# Patient Record
Sex: MALE | Race: WHITE | Employment: FULL TIME | URBAN - METROPOLITAN AREA
[De-identification: names, ages, dates, MRNs, and addresses within clinical notes are randomized per-mention and may not be internally consistent; named-entity substitution may affect disease eponyms.]

---

## 2019-05-10 ENCOUNTER — TELEPHONE (OUTPATIENT)
Dept: GASTROENTEROLOGY | Facility: CLINIC | Age: 44
End: 2019-05-10

## 2019-05-10 ENCOUNTER — OFFICE VISIT (OUTPATIENT)
Dept: GASTROENTEROLOGY | Facility: CLINIC | Age: 44
End: 2019-05-10
Payer: COMMERCIAL

## 2019-05-10 VITALS
WEIGHT: 197.81 LBS | HEART RATE: 67 BPM | BODY MASS INDEX: 27.69 KG/M2 | HEIGHT: 70.75 IN | SYSTOLIC BLOOD PRESSURE: 113 MMHG | DIASTOLIC BLOOD PRESSURE: 64 MMHG

## 2019-05-10 DIAGNOSIS — K21.9 GASTROESOPHAGEAL REFLUX DISEASE, ESOPHAGITIS PRESENCE NOT SPECIFIED: Primary | ICD-10-CM

## 2019-05-10 PROCEDURE — 99203 OFFICE O/P NEW LOW 30 MIN: CPT | Performed by: INTERNAL MEDICINE

## 2019-05-10 PROCEDURE — 99212 OFFICE O/P EST SF 10 MIN: CPT | Performed by: INTERNAL MEDICINE

## 2019-05-10 RX ORDER — OMEPRAZOLE 20 MG/1
20 CAPSULE, DELAYED RELEASE ORAL EVERY MORNING
Qty: 90 CAPSULE | Refills: 3 | Status: SHIPPED | OUTPATIENT
Start: 2019-05-10 | End: 2019-06-05

## 2019-05-10 RX ORDER — AMOXICILLIN 500 MG/1
500 CAPSULE ORAL 3 TIMES DAILY
Refills: 0 | COMMUNITY
Start: 2019-05-07 | End: 2020-01-21

## 2019-05-10 NOTE — PATIENT INSTRUCTIONS
1. Schedule upper endoscopy (EGD) with monitored anesthesia care (MAC) with Dr. David Romero: GERD]    2. Gastroesophageal reflux disease (GERD) recommendations    -raise the head of the bed  -avoid tight fitting clothing or belts  -avoid eating la

## 2019-05-10 NOTE — TELEPHONE ENCOUNTER
Scheduled for:  EGD 07493  Provider Name: Dr. Alice Lui  Date:  6/5/19  [facility]  Sedation:  MAC  Time:  9:30 am, (pt is aware that [facility] will call the day before to confirm arrival time)  Prep: NPO after midnight  Meds/Allergies Reconciled?:  Physician Garett Graff

## 2019-05-10 NOTE — H&P
[facility], [facility] - Gastroenterology                                                                                                  Clinic History and Physical mcg by mouth before breakfast. Disp:  Rfl:      Allergies:  No Known Allergies    ROS:   all 10 systems were reviewed and were negative except as noted in the HPI    PHYSICAL EXAM:   Blood pressure 113/64, pulse 67, height 5' 10.75\" (1.797 m), weight 197 Prescriptions      No prescriptions requested or ordered in this encounter       Imaging & Referrals:  None       Audrey Vidal MD  [facility], [facility] - Gastroenterology  5/10/2019

## 2019-05-20 NOTE — TELEPHONE ENCOUNTER
CBLM to reschedule procedure. Please transfer to [place] at ext [phone] or [phone] for scheduling. Or please transfer to [place] in GI if unavailable. HOLD placed at [facility] on 6/19/19.

## 2019-06-03 NOTE — TELEPHONE ENCOUNTER
Spoke with pt's Mother. Per Destin Channel, pt does NOT need to be rescheduled. Pt's Mother was given procedure date & advised that [facility] will call with arrival time. She verbalized understanding.     Electronic case request was sent to [facility] via [facility], patients insurance ca

## 2019-06-03 NOTE — TELEPHONE ENCOUNTER
Pts mother Jonathon Schaefer called to verify time of procedure. Does this appt need to be rescheduled? Please call.

## 2019-06-05 ENCOUNTER — LAB REQUISITION (OUTPATIENT)
Dept: LAB | Facility: HOSPITAL | Age: 44
End: 2019-06-05
Payer: COMMERCIAL

## 2019-06-05 ENCOUNTER — TELEPHONE (OUTPATIENT)
Dept: GASTROENTEROLOGY | Facility: CLINIC | Age: 44
End: 2019-06-05

## 2019-06-05 DIAGNOSIS — Z01.89 ENCOUNTER FOR OTHER SPECIFIED SPECIAL EXAMINATIONS: ICD-10-CM

## 2019-06-05 PROCEDURE — 88312 SPECIAL STAINS GROUP 1: CPT | Performed by: INTERNAL MEDICINE

## 2019-06-05 PROCEDURE — 88305 TISSUE EXAM BY PATHOLOGIST: CPT | Performed by: INTERNAL MEDICINE

## 2019-06-05 RX ORDER — PANTOPRAZOLE SODIUM 40 MG/1
40 TABLET, DELAYED RELEASE ORAL
Qty: 120 TABLET | Refills: 1 | Status: SHIPPED | OUTPATIENT
Start: 2019-06-05 | End: 2019-09-05

## 2019-06-05 NOTE — TELEPHONE ENCOUNTER
Patient at [facility] today for EGD for GERD. Has LA Grade D esophagitis.      Discussed stopping omeprazole and starting pantoprazole BID for 60 days and repeat EGD in 8-10 weeks    GI Staff;    Please call him to schedule repeat EGD with MAC for diagnosis of

## 2019-06-06 ENCOUNTER — TELEPHONE (OUTPATIENT)
Dept: GASTROENTEROLOGY | Facility: CLINIC | Age: 44
End: 2019-06-06

## 2019-06-06 NOTE — TELEPHONE ENCOUNTER
Called patient to discuss biopsy results. No answer.  Left voice message    January Gordon MD  [facility], [facility] - Gastroenterology  6/6/2019  11:50 AM

## 2019-06-10 NOTE — TELEPHONE ENCOUNTER
Called patient and discussed results of biopsies which were unremarkable    Discussed plan/recommendations for PPI BID for 8 weeks then repeat EGD to eval for healing and underlying Justin's esophagus    All questions answered    Hanna Rodríguez MD  [facility]

## 2019-09-05 RX ORDER — PANTOPRAZOLE SODIUM 40 MG/1
40 TABLET, DELAYED RELEASE ORAL
Qty: 180 TABLET | Refills: 1 | Status: SHIPPED | OUTPATIENT
Start: 2019-09-05 | End: 2019-11-04

## 2019-09-05 NOTE — TELEPHONE ENCOUNTER
Requested Prescriptions     Pending Prescriptions Disp Refills   • PANTOPRAZOLE SODIUM 40 MG Oral Tab EC [Pharmacy Med Name: PANTOPRAZOLE SOD DR 40 MG TAB] 180 tablet 1     Sig: TAKE 1 TABLET (40 MG TOTAL) BY MOUTH 2 (TWO) TIMES DAILY BEFORE MEALS.      Kristie Vilchis

## 2019-12-18 ENCOUNTER — TELEPHONE (OUTPATIENT)
Dept: GASTROENTEROLOGY | Facility: CLINIC | Age: 44
End: 2019-12-18

## 2019-12-18 NOTE — TELEPHONE ENCOUNTER
Mother requesting for appt with MG before the end on the year due to severe acid reflux. Mother states pt is also having trouble breathing.   Please call [phone]

## 2019-12-18 NOTE — TELEPHONE ENCOUNTER
Patient contacted and message from Dr. Noé Resendiz given. Patient states he takes Pantoprazole once or twice daily. Patient will keep appointment with Reena Polanco on 01/21/20.     Patient advised to go to the ER if he develops shortness of breath or chest pa

## 2019-12-18 NOTE — TELEPHONE ENCOUNTER
Mother (not on HIPAA) states patient is overdue for EGD and is having problems with acid reflux on and off and shortness of breath. Denies shortness of breath ,chest pain or acid reflux at this time per mother.     Dr. Nataly Centenor do you want to schedule pat

## 2019-12-18 NOTE — TELEPHONE ENCOUNTER
The patient should be on pantoprazole BID. Has he been off? If he's not been taking it BID for 8 weeks, I'm concerned there will still be significant inflammation. We can schedule him for EGD to re-evaluate as was recommended.  I also think it is a good harvey

## 2019-12-30 NOTE — TELEPHONE ENCOUNTER
Dr. Kevin Summers - what time frame would you like this pt's EGD scheduled in? Is it urgent or can it wait? Please advise & I will call to schedule. Thank you!

## 2019-12-31 NOTE — TELEPHONE ENCOUNTER
The EGD can be scheduled non-urgently    Thank you    Johnson Matt MD  [facility], [facility] - Gastroenterology  12/31/2019  12:31 PM

## 2020-01-17 NOTE — PROGRESS NOTES
[facility] Follow-up Visit    Gracy apnea.     Pertinent Family Hx:  - No known history of esophageal or gastric cancer  - No family hx of CRC    Pertinent Social Hx:  - No tobacco use/No ETOH  - Lives with wife     History, Medications, Allergies, ROS:      Past Medical History:   Diagnosis patient is having movements of all 4 extremities   Psych: calm, cooperative    Nursing notes and vitals reviewed. Labs/Imaging:     Patient's labs and imaging were reviewed and discussed with patient today.      ASSESSMENT/PLAN:   Jairo Mike is a 38 year understanding], including but not limited to the risks of bleeding, infection, pain, as well as the risks of anesthesia and perforation all leading to prolonged hospitalization, surgical intervention, or even death.  I also specifically mentioned the miss r

## 2020-01-21 ENCOUNTER — TELEPHONE (OUTPATIENT)
Dept: GASTROENTEROLOGY | Facility: CLINIC | Age: 45
End: 2020-01-21

## 2020-01-21 ENCOUNTER — OFFICE VISIT (OUTPATIENT)
Dept: GASTROENTEROLOGY | Facility: CLINIC | Age: 45
End: 2020-01-21
Payer: COMMERCIAL

## 2020-01-21 VITALS
DIASTOLIC BLOOD PRESSURE: 76 MMHG | BODY MASS INDEX: 27.2 KG/M2 | HEIGHT: 70 IN | WEIGHT: 190 LBS | HEART RATE: 68 BPM | SYSTOLIC BLOOD PRESSURE: 115 MMHG

## 2020-01-21 DIAGNOSIS — R12 HEARTBURN: ICD-10-CM

## 2020-01-21 DIAGNOSIS — Z09 FOLLOW UP: Primary | ICD-10-CM

## 2020-01-21 DIAGNOSIS — K21.00 GASTROESOPHAGEAL REFLUX DISEASE WITH ESOPHAGITIS: ICD-10-CM

## 2020-01-21 DIAGNOSIS — K21.00 GASTROESOPHAGEAL REFLUX DISEASE WITH ESOPHAGITIS: Primary | ICD-10-CM

## 2020-01-21 PROCEDURE — 99214 OFFICE O/P EST MOD 30 MIN: CPT | Performed by: PHYSICIAN ASSISTANT

## 2020-01-21 NOTE — TELEPHONE ENCOUNTER
I left a voicemail message for pt informing him to please correct Dx code to read K21.0  No need to return my call unless, he has any questions.

## 2020-01-21 NOTE — TELEPHONE ENCOUNTER
Scheduled for: EGD 20271  Provider Name: Dr Dane Calles  Date: Luisa Albrecht 2/11/20  Location: [address] [place] [address]  Sedation: MAC  Time: 2:15 pm  Prep: Nothing after midnight the day before procedure and NPO 3 hrs prior procedure  Meds/Allergies Reconciled?: NKDA  Diagnosis with c

## 2020-01-21 NOTE — PATIENT INSTRUCTIONS
-EGD with MAC sedation with Dr. Oly Funez   -Medication Changes:    ** If MAC @ [facility]/NE:    - HOLD ACE/ARBs the night before and/or the day of the procedure(s)   - NO alcohol, recreational drugs nor erectile dysfunction mediations 24 hours before procedure(

## 2020-02-07 RX ORDER — PANTOPRAZOLE SODIUM 20 MG/1
20 TABLET, DELAYED RELEASE ORAL
Status: ON HOLD | COMMUNITY
End: 2020-02-11

## 2020-02-11 ENCOUNTER — HOSPITAL ENCOUNTER (OUTPATIENT)
Age: 45
Setting detail: HOSPITAL OUTPATIENT SURGERY
Discharge: HOME | End: 2020-02-11
Attending: INTERNAL MEDICINE | Admitting: INTERNAL MEDICINE
Payer: COMMERCIAL

## 2020-02-11 ENCOUNTER — ANESTHESIA (OUTPATIENT)
Dept: ENDOSCOPY | Age: 45
End: 2020-02-11
Payer: COMMERCIAL

## 2020-02-11 ENCOUNTER — TELEPHONE (OUTPATIENT)
Dept: GASTROENTEROLOGY | Facility: CLINIC | Age: 45
End: 2020-02-11

## 2020-02-11 ENCOUNTER — ANESTHESIA EVENT (OUTPATIENT)
Dept: ENDOSCOPY | Age: 45
End: 2020-02-11
Payer: COMMERCIAL

## 2020-02-11 VITALS
WEIGHT: 185 LBS | HEART RATE: 58 BPM | HEIGHT: 70 IN | RESPIRATION RATE: 13 BRPM | OXYGEN SATURATION: 100 % | BODY MASS INDEX: 26.48 KG/M2 | DIASTOLIC BLOOD PRESSURE: 78 MMHG | SYSTOLIC BLOOD PRESSURE: 112 MMHG

## 2020-02-11 DIAGNOSIS — R12 HEARTBURN: ICD-10-CM

## 2020-02-11 DIAGNOSIS — K21.00 GASTROESOPHAGEAL REFLUX DISEASE WITH ESOPHAGITIS: ICD-10-CM

## 2020-02-11 PROCEDURE — 43239 EGD BIOPSY SINGLE/MULTIPLE: CPT | Performed by: INTERNAL MEDICINE

## 2020-02-11 PROCEDURE — 99070 SPECIAL SUPPLIES PHYS/QHP: CPT | Performed by: INTERNAL MEDICINE

## 2020-02-11 RX ORDER — SODIUM CHLORIDE, SODIUM LACTATE, POTASSIUM CHLORIDE, CALCIUM CHLORIDE 600; 310; 30; 20 MG/100ML; MG/100ML; MG/100ML; MG/100ML
INJECTION, SOLUTION INTRAVENOUS CONTINUOUS
Status: DISCONTINUED | OUTPATIENT
Start: 2020-02-11 | End: 2020-02-11

## 2020-02-11 RX ORDER — LIDOCAINE HYDROCHLORIDE 10 MG/ML
INJECTION, SOLUTION EPIDURAL; INFILTRATION; INTRACAUDAL; PERINEURAL AS NEEDED
Status: DISCONTINUED | OUTPATIENT
Start: 2020-02-11 | End: 2020-02-11 | Stop reason: SURG

## 2020-02-11 RX ORDER — PANTOPRAZOLE SODIUM 40 MG/1
40 TABLET, DELAYED RELEASE ORAL DAILY
Qty: 90 TABLET | Refills: 3 | Status: SHIPPED | OUTPATIENT
Start: 2020-02-11 | End: 2020-09-21

## 2020-02-11 RX ADMIN — SODIUM CHLORIDE, SODIUM LACTATE, POTASSIUM CHLORIDE, CALCIUM CHLORIDE: 600; 310; 30; 20 INJECTION, SOLUTION INTRAVENOUS at 14:51:00

## 2020-02-11 RX ADMIN — LIDOCAINE HYDROCHLORIDE 50 MG: 10 INJECTION, SOLUTION EPIDURAL; INFILTRATION; INTRACAUDAL; PERINEURAL at 14:38:00

## 2020-02-11 RX ADMIN — SODIUM CHLORIDE, SODIUM LACTATE, POTASSIUM CHLORIDE, CALCIUM CHLORIDE: 600; 310; 30; 20 INJECTION, SOLUTION INTRAVENOUS at 14:38:00

## 2020-02-11 NOTE — OPERATIVE REPORT
Esophagogastroduodenoscopy (EGD) Report    Stephen MOREL 1975 Age 38year old   PCP None Pcp Endoscopist Estephanie Garcia MD     Date of procedure: 20    Procedure: EGD w/ biopsy     Pre-operative diagnosis: GERD with esophagitis    Post-op colored mucosa possibly representing Justin's esophagus. The segment was examined with high definition white light and narrow band imaging without any nodules or other mucosal abnormalities noted. Multiple cold forceps biopsies were obtained.  The esophage

## 2020-02-11 NOTE — ANESTHESIA PREPROCEDURE EVALUATION
Anesthesia PreOp Note    HPI:     Radames Bates is a 38year old male who presents for preoperative consultation requested by: Evelio Lomeli MD    Date of Surgery: 2/11/2020    Procedure(s):  ESOPHAGOGASTRODUODENOSCOPY (EGD)  Indication: Gastroesophageal status: Never Smoker      Smokeless tobacco: Never Used    Substance and Sexual Activity      Alcohol use: Never        Frequency: Never      Drug use: Never      Sexual activity: Not on file    Lifestyle      Physical activity:        Days per week: Not o risks including possible dental damage if relevant, major complications, and any alternative forms of anesthetic management. All of the patient's questions were answered to the best of my ability. The patient desires the anesthetic management as planned.

## 2020-02-11 NOTE — ANESTHESIA POSTPROCEDURE EVALUATION
Patient: Kahty Ayon    Procedure Summary     Date:  02/11/20 Room / Location:  [facility] ENDOSCOPY 01 / [facility] ENDO    Anesthesia Start:  1857 Anesthesia Stop:  3334    Procedure:  ESOPHAGOGASTRODUODENOSCOPY (EGD) (N/A ) Diagnosis:       Gastroesophageal reflux

## 2020-02-11 NOTE — H&P
History & Physical Examination    Patient Name: Sancho Zee  MRN: [id number]  CSN: [id number]  YOB: 1975    Diagnosis: GERD with esophagitis    Pantoprazole Sodium 20 MG Oral Tab EC, Take 20 mg by mouth every morning before breakfast., Disp: ,

## 2020-02-11 NOTE — TELEPHONE ENCOUNTER
Patient post-endoscopy today at [facility] says he needs refill on pantoprazole    Sending script    Rigoberto Dixon MD  [facility], [facility] - Gastroenterology  2/11/2020  3:02 PM

## 2020-02-12 ENCOUNTER — TELEPHONE (OUTPATIENT)
Dept: GASTROENTEROLOGY | Facility: CLINIC | Age: 45
End: 2020-02-12

## 2020-02-13 NOTE — TELEPHONE ENCOUNTER
Entered into Epic:Recall EGD in 3 years per  Dr. Ingram Slider for Justin's esophagus. Last EGD done 2/11/2020, next due 2/11/2023. HM updated.

## 2020-02-13 NOTE — TELEPHONE ENCOUNTER
Called patient and spoke with him    Discussed the findings on the EGD consistent with Justin's esophagus. Discussed it is a pre-cancerous change and cancer can develop in the esophagus though the risk is low.  Discussed recommendations for PPI once daily

## 2020-09-21 ENCOUNTER — TELEPHONE (OUTPATIENT)
Dept: GASTROENTEROLOGY | Facility: CLINIC | Age: 45
End: 2020-09-21

## 2020-09-21 RX ORDER — PANTOPRAZOLE SODIUM 40 MG/1
40 TABLET, DELAYED RELEASE ORAL
Qty: 120 TABLET | Refills: 0 | Status: SHIPPED | OUTPATIENT
Start: 2020-09-21 | End: 2020-10-20

## 2020-09-21 NOTE — TELEPHONE ENCOUNTER
Dr Kacie Torre, patient does need rx sent to [address] [place] [address] please. Accepted appt with you Thurs Nov 5, arrival 2:15pm and given directions to [facility] office. Thanks, may close encounter once rx sent.

## 2020-09-21 NOTE — TELEPHONE ENCOUNTER
Patient called in stating that he needs a endoscopy procedure. I advised he would need 30 min appt first.Patient is wondering if he can bypass the appt and make an appt for the procedure right away.  Please advise thank you

## 2020-09-21 NOTE — TELEPHONE ENCOUNTER
He should take his pantoprazole twice a day at this time. Does he need a new prescription?     He should also make a non-urgent follow up to see how he is doing after a period of time on this    Thanks    Sanjay Edmondson MD  [address] [place] [address]

## 2020-09-21 NOTE — TELEPHONE ENCOUNTER
Dr Deana Austin, patient contacted re below egd request. He had egd 2/11/2020, Justin's, with 3 year recall. States reflux has worsened at night--he wakes up with acid in his throat, coughing.  Reviewed reflux precautions in detail and is following pretty wel

## 2020-09-21 NOTE — TELEPHONE ENCOUNTER
Pantoprazole BID sent    January Gordon MD  [facility], [facility] - Gastroenterology  9/21/2020  6:03 PM

## 2020-10-20 RX ORDER — PANTOPRAZOLE SODIUM 40 MG/1
40 TABLET, DELAYED RELEASE ORAL
Qty: 60 TABLET | Refills: 1 | Status: SHIPPED | OUTPATIENT
Start: 2020-10-20 | End: 2020-12-15

## 2020-10-20 NOTE — TELEPHONE ENCOUNTER
Requested Prescriptions     Pending Prescriptions Disp Refills   • PANTOPRAZOLE SODIUM 40 MG Oral Tab EC [Pharmacy Med Name: PANTOPRAZOLE SOD DR 40 MG TAB] 60 tablet 1     Sig: TAKE 1 TABLET (40 MG TOTAL) BY MOUTH 2 (TWO) TIMES DAILY BEFORE MEALS.      LOV

## 2020-11-05 ENCOUNTER — OFFICE VISIT (OUTPATIENT)
Dept: GASTROENTEROLOGY | Facility: CLINIC | Age: 45
End: 2020-11-05
Payer: COMMERCIAL

## 2020-11-05 ENCOUNTER — TELEPHONE (OUTPATIENT)
Dept: GASTROENTEROLOGY | Facility: CLINIC | Age: 45
End: 2020-11-05

## 2020-11-05 VITALS
TEMPERATURE: 96 F | WEIGHT: 207 LBS | BODY MASS INDEX: 29.63 KG/M2 | DIASTOLIC BLOOD PRESSURE: 84 MMHG | SYSTOLIC BLOOD PRESSURE: 124 MMHG | HEIGHT: 70 IN

## 2020-11-05 DIAGNOSIS — K22.70 BARRETT'S ESOPHAGUS WITHOUT DYSPLASIA: ICD-10-CM

## 2020-11-05 DIAGNOSIS — K21.00 GASTROESOPHAGEAL REFLUX DISEASE WITH ESOPHAGITIS WITHOUT HEMORRHAGE: Primary | ICD-10-CM

## 2020-11-05 PROCEDURE — 3079F DIAST BP 80-89 MM HG: CPT | Performed by: INTERNAL MEDICINE

## 2020-11-05 PROCEDURE — 3074F SYST BP LT 130 MM HG: CPT | Performed by: INTERNAL MEDICINE

## 2020-11-05 PROCEDURE — 3008F BODY MASS INDEX DOCD: CPT | Performed by: INTERNAL MEDICINE

## 2020-11-05 PROCEDURE — 99214 OFFICE O/P EST MOD 30 MIN: CPT | Performed by: INTERNAL MEDICINE

## 2020-11-05 RX ORDER — FAMOTIDINE 40 MG/1
40 TABLET, FILM COATED ORAL NIGHTLY PRN
Qty: 90 TABLET | Refills: 3 | Status: SHIPPED | OUTPATIENT
Start: 2020-11-05 | End: 2021-11-02

## 2020-11-05 NOTE — PROGRESS NOTES
[facility], [facility] - Gastroenterology                                                                                                  Clinic Progress Note    Patient pr ENDOSCOPY,EXAM        Family Hx:   Family History   Problem Relation Age of Onset   • Other (Other) Father         Unknown caused   • No Known Problems Mother       Social History: Social History    Tobacco Use      Smoking status: Never Smoker      Smokel to the lining of the intestine. This process is called intestinal metaplasia. We discussed no signs or symptoms are associated with Justin's esophagus, but it is commonly found in people with gastroesophageal reflux disease (GERD).  A small number of peopl problems like HTN, Dm, older age, etc, which he does not have.  There has also been a recent randomized study of PPIs with a large number of patients 3 years in length without any of the concerning/proposed adverse events aside from possible increase in ent

## 2020-11-05 NOTE — TELEPHONE ENCOUNTER
GI staff:    Please change recall for EGD to June 2021    Then close encounter    Thanks    Herberth Jimenez MD  [facility], [facility] - Gastroenterology  11/5/2020  2:51 PM

## 2020-11-05 NOTE — PATIENT INSTRUCTIONS
1. Continue your pantoprazole 40 mg twice a day. Best if you take the morning dose 30 minutes before breakfast in the morning and second dose 30 minutes before dinner in the evening. It is ok to take with food or after food if you forget.     2. Take famoti

## 2020-12-15 RX ORDER — PANTOPRAZOLE SODIUM 40 MG/1
40 TABLET, DELAYED RELEASE ORAL
Qty: 60 TABLET | Refills: 1 | Status: SHIPPED | OUTPATIENT
Start: 2020-12-15 | End: 2021-04-09

## 2021-04-09 RX ORDER — PANTOPRAZOLE SODIUM 40 MG/1
TABLET, DELAYED RELEASE ORAL
Qty: 90 TABLET | Refills: 3 | Status: SHIPPED | OUTPATIENT
Start: 2021-04-09 | End: 2022-01-26

## 2021-04-09 NOTE — TELEPHONE ENCOUNTER
Requested Prescriptions     Pending Prescriptions Disp Refills   • PANTOPRAZOLE SODIUM 40 MG Oral Tab EC [Pharmacy Med Name: PANTOPRAZOLE SOD DR 40 MG TAB] 90 tablet 3     Sig: TAKE 1 TABLET BY MOUTH EVERY DAY     LOV: 11/05/2020  Last Refill: 12/15/2020

## 2021-04-15 ENCOUNTER — TELEPHONE (OUTPATIENT)
Dept: GASTROENTEROLOGY | Facility: CLINIC | Age: 46
End: 2021-04-15

## 2021-04-15 NOTE — TELEPHONE ENCOUNTER
Patient outreach message received. Patient due for repeat EGD in June 2021. \"Please change recall for EGD to June 2021\"    Recall reminder letter mailed out to patient.

## 2021-11-01 ENCOUNTER — TELEPHONE (OUTPATIENT)
Dept: GASTROENTEROLOGY | Facility: CLINIC | Age: 46
End: 2021-11-01

## 2021-11-01 DIAGNOSIS — K22.70 BARRETT'S ESOPHAGUS WITHOUT DYSPLASIA: ICD-10-CM

## 2021-11-01 DIAGNOSIS — K21.9 GASTROESOPHAGEAL REFLUX DISEASE, UNSPECIFIED WHETHER ESOPHAGITIS PRESENT: Primary | ICD-10-CM

## 2021-11-01 NOTE — TELEPHONE ENCOUNTER
Pt is taking pantoprazole BID & famotidine at nighttime    This regimen is not working for him    He does avoid spicy greasy foods. He does not drink alcohol or coffee. He drinks water or mild.  He does have an occasional can of soda    Pt wakes up around 1

## 2021-11-02 RX ORDER — FAMOTIDINE 40 MG/1
40 TABLET, FILM COATED ORAL NIGHTLY PRN
Qty: 90 TABLET | Refills: 3 | Status: SHIPPED | OUTPATIENT
Start: 2021-11-02

## 2021-11-02 NOTE — TELEPHONE ENCOUNTER
We had discussed repeating his EGD, so yes, should schedule this. He should be taking the pantoprazole twice a day best if take 30 minutes before breakfast and 30 minutes before dinner. I would also make sure he is taking the famotidine at bedtime.  Can

## 2021-11-02 NOTE — TELEPHONE ENCOUNTER
Requested Prescriptions     Pending Prescriptions Disp Refills   • FAMOTIDINE 40 MG Oral Tab [Pharmacy Med Name: FAMOTIDINE 40 MG TABLET] 90 tablet 3     Sig: TAKE 1 TABLET (40 MG TOTAL) BY MOUTH NIGHTLY AS NEEDED FOR HEARTBURN.        LOV 11/05/2020  LR  1

## 2021-11-03 NOTE — TELEPHONE ENCOUNTER
Dr. Kacie Torre,    Please clarify how much patient should be taking of Famotidine as he currently takes 1 tab  (40 mg total) as needed. Patient does not feel this makes a difference when he does use it.

## 2021-11-04 RX ORDER — SUCRALFATE 1 G/1
1 TABLET ORAL 4 TIMES DAILY PRN
Qty: 120 TABLET | Refills: 3 | Status: SHIPPED | OUTPATIENT
Start: 2021-11-04

## 2021-11-04 NOTE — TELEPHONE ENCOUNTER
Ok, then can stop famotidine if not helping. Can try carafate QID PRN. Will send to pharmacy.     Adelfo Singh MD  [facility], [facility] - Gastroenterology  11/4/2021  12:27 PM

## 2021-11-04 NOTE — TELEPHONE ENCOUNTER
Contacted patient and reviewed message below for MG. Patient will try medication and see how it works.      GI Schedulers--    Please contact patient to schedule procedure per Dr. Ian Wilson orders:     Please schedule EGD with MAC for GERD and Justin's e

## 2021-11-05 ENCOUNTER — HOSPITAL ENCOUNTER (EMERGENCY)
Facility: HOSPITAL | Age: 46
Discharge: HOME | End: 2021-11-05
Attending: EMERGENCY MEDICINE
Payer: COMMERCIAL

## 2021-11-05 VITALS
WEIGHT: 200 LBS | BODY MASS INDEX: 29 KG/M2 | OXYGEN SATURATION: 99 % | HEART RATE: 81 BPM | SYSTOLIC BLOOD PRESSURE: 120 MMHG | DIASTOLIC BLOOD PRESSURE: 81 MMHG | TEMPERATURE: 97 F | RESPIRATION RATE: 18 BRPM

## 2021-11-05 DIAGNOSIS — S61.213A LACERATION OF LEFT MIDDLE FINGER WITHOUT FOREIGN BODY WITHOUT DAMAGE TO NAIL, INITIAL ENCOUNTER: Primary | ICD-10-CM

## 2021-11-05 PROCEDURE — 99282 EMERGENCY DEPT VISIT SF MDM: CPT
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
## 2021-11-24 NOTE — TELEPHONE ENCOUNTER
Scheduled for:  EGD 88794  Provider Name:  Dr. Leslye Almeida  Date:  1/25/2022  Location:  [address] [place] [address]  Sedation:  MAC  Time:  2:00 pm, arrival 1:00 pm  Prep:  NPO after midnight  Meds/Allergies Reconciled?:  Yes  Diagnosis with codes:  GERD K21.9; Justin's esophagu

## 2022-01-22 ENCOUNTER — LAB ENCOUNTER (OUTPATIENT)
Dept: LAB | Facility: HOSPITAL | Age: 47
End: 2022-01-22
Attending: INTERNAL MEDICINE
Payer: COMMERCIAL

## 2022-01-22 DIAGNOSIS — Z01.818 PRE-OP TESTING: ICD-10-CM

## 2022-01-23 LAB — SARS-COV-2 RNA RESP QL NAA+PROBE: NOT DETECTED

## 2022-01-24 ENCOUNTER — TELEPHONE (OUTPATIENT)
Dept: GASTROENTEROLOGY | Facility: CLINIC | Age: 47
End: 2022-01-24

## 2022-01-25 ENCOUNTER — TELEPHONE (OUTPATIENT)
Dept: GASTROENTEROLOGY | Facility: CLINIC | Age: 47
End: 2022-01-25

## 2022-01-25 ENCOUNTER — ANESTHESIA EVENT (OUTPATIENT)
Dept: ENDOSCOPY | Age: 47
End: 2022-01-25
Payer: COMMERCIAL

## 2022-01-25 ENCOUNTER — HOSPITAL ENCOUNTER (OUTPATIENT)
Age: 47
Setting detail: HOSPITAL OUTPATIENT SURGERY
Discharge: HOME | End: 2022-01-25
Attending: INTERNAL MEDICINE | Admitting: INTERNAL MEDICINE
Payer: COMMERCIAL

## 2022-01-25 ENCOUNTER — ANESTHESIA (OUTPATIENT)
Dept: ENDOSCOPY | Age: 47
End: 2022-01-25
Payer: COMMERCIAL

## 2022-01-25 VITALS
SYSTOLIC BLOOD PRESSURE: 115 MMHG | RESPIRATION RATE: 11 BRPM | HEIGHT: 70 IN | OXYGEN SATURATION: 100 % | HEART RATE: 58 BPM | BODY MASS INDEX: 28.63 KG/M2 | DIASTOLIC BLOOD PRESSURE: 71 MMHG | WEIGHT: 200 LBS

## 2022-01-25 DIAGNOSIS — Z01.818 PRE-OP TESTING: Primary | ICD-10-CM

## 2022-01-25 DIAGNOSIS — K22.70 BARRETT'S ESOPHAGUS WITHOUT DYSPLASIA: ICD-10-CM

## 2022-01-25 DIAGNOSIS — K21.00 GASTROESOPHAGEAL REFLUX DISEASE WITH ESOPHAGITIS, UNSPECIFIED WHETHER HEMORRHAGE: Primary | ICD-10-CM

## 2022-01-25 DIAGNOSIS — Z12.12 SCREENING FOR COLORECTAL CANCER: ICD-10-CM

## 2022-01-25 DIAGNOSIS — Z12.11 SCREENING FOR COLORECTAL CANCER: ICD-10-CM

## 2022-01-25 DIAGNOSIS — K21.9 GASTROESOPHAGEAL REFLUX DISEASE, UNSPECIFIED WHETHER ESOPHAGITIS PRESENT: ICD-10-CM

## 2022-01-25 PROCEDURE — 88312 SPECIAL STAINS GROUP 1: CPT | Performed by: INTERNAL MEDICINE

## 2022-01-25 PROCEDURE — 43239 EGD BIOPSY SINGLE/MULTIPLE: CPT | Performed by: INTERNAL MEDICINE

## 2022-01-25 PROCEDURE — 99070 SPECIAL SUPPLIES PHYS/QHP: CPT | Performed by: INTERNAL MEDICINE

## 2022-01-25 PROCEDURE — 88305 TISSUE EXAM BY PATHOLOGIST: CPT | Performed by: INTERNAL MEDICINE

## 2022-01-25 RX ORDER — SODIUM CHLORIDE, SODIUM LACTATE, POTASSIUM CHLORIDE, CALCIUM CHLORIDE 600; 310; 30; 20 MG/100ML; MG/100ML; MG/100ML; MG/100ML
INJECTION, SOLUTION INTRAVENOUS CONTINUOUS
Status: DISCONTINUED | OUTPATIENT
Start: 2022-01-25 | End: 2022-01-25

## 2022-01-25 RX ORDER — LIDOCAINE HYDROCHLORIDE 10 MG/ML
INJECTION, SOLUTION EPIDURAL; INFILTRATION; INTRACAUDAL; PERINEURAL AS NEEDED
Status: DISCONTINUED | OUTPATIENT
Start: 2022-01-25 | End: 2022-01-25 | Stop reason: SURG

## 2022-01-25 RX ADMIN — LIDOCAINE HYDROCHLORIDE 25 MG: 10 INJECTION, SOLUTION EPIDURAL; INFILTRATION; INTRACAUDAL; PERINEURAL at 13:42:00

## 2022-01-25 RX ADMIN — SODIUM CHLORIDE, SODIUM LACTATE, POTASSIUM CHLORIDE, CALCIUM CHLORIDE: 600; 310; 30; 20 INJECTION, SOLUTION INTRAVENOUS at 13:42:00

## 2022-01-25 NOTE — OPERATIVE REPORT
Esophagogastroduodenoscopy (EGD) Report    Maxim MOREL 1975 Age 51year old   PCP Joce Vee MD     Date of procedure: 22    Procedure: EGD w/ biopsy     Pre-operative diagnosis: GERD, Justin's esophagus the squamocolumnar junction of 50-75% consistent with LA grade C/D esophagitis.  Evaluation for previously visualized and diagnosed Justin's was not possible in this setting and thus no biopsies taken due to need to improve esophagitis for evaluation of th

## 2022-01-25 NOTE — ANESTHESIA POSTPROCEDURE EVALUATION
Patient: Wang Morton    Procedure Summary     Date: 01/25/22 Room / Location: [facility] ENDOSCOPY 01 / [facility] ENDO    Anesthesia Start: 3310 Anesthesia Stop: 8019    Procedure: ESOPHAGOGASTRODUODENOSCOPY (EGD) (N/A ) Diagnosis:       Gastroesophageal reflux disea
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
## 2022-01-25 NOTE — TELEPHONE ENCOUNTER
GI Schedulers--    Please see message below to assist in schedule patient.  Should be scheduled for the next 2-3 months [facility]

## 2022-01-25 NOTE — TELEPHONE ENCOUNTER
1st attempt calling patient ,lmtcb to assist him of scheduling his procedure for Egd and Colonoscopy per Alon Hardwick. Awaiting for reply call  back from patient .   Gi schedulers:    GI staff:     Please find out what his mail order pharmacy is with his wif

## 2022-01-25 NOTE — TELEPHONE ENCOUNTER
Discussed with patient after procedure findings. Says he takes pantoprazole once a day and sometimes twice a day. Discussed he needs to take his PPI twice a day. Discussed also would like to change to omeprazole 40 mg PO BID at this time.  Discussed we didn

## 2022-01-25 NOTE — H&P
History & Physical Examination    Patient Name: Isaac Degroot  MRN: [id number]  [facility]: [id number]  YOB: 1975    Diagnosis: GERD, Justin's esophagus    sucralfate 1 g Oral Tab, Take 1 tablet (1 g total) by mouth 4 (four) times daily as needed. , Dis

## 2022-01-25 NOTE — TELEPHONE ENCOUNTER
I spoke to the pt and he knows to arrive at the [address] [facility] location at 1 pm    He was also told he can view his instructions on [employer]    We went over the instructions on the phone    All of his questions were answered

## 2022-01-25 NOTE — ANESTHESIA PREPROCEDURE EVALUATION
Anesthesia PreOp Note    HPI:     Augusta Evans is a 51year old male who presents for preoperative consultation requested by: Rajesh Munoz MD    Date of Surgery: 1/25/2022    Procedure(s):  ESOPHAGOGASTRODUODENOSCOPY (EGD)  Indication: Gastroesophageal used    Substance and Sexual Activity      Alcohol use: Never      Drug use: Never      Sexual activity: Not on file    Other Topics      Concerns:        Not on file    Social History Narrative      Not on file    Social Determinants of Health  Financial MD  1/25/2022 1:10 PM

## 2022-01-25 NOTE — TELEPHONE ENCOUNTER
Left message for Dakotah Astorga to call back. Please obtain mail order pharmacy name/address to send omeprazole per patient's request.     Sent schedulers separate TE with orders to schedule patient procedure.

## 2022-01-26 RX ORDER — OMEPRAZOLE 40 MG/1
40 CAPSULE, DELAYED RELEASE ORAL 2 TIMES DAILY
Qty: 180 CAPSULE | Refills: 3 | Status: SHIPPED | OUTPATIENT
Start: 2022-01-26

## 2022-01-26 NOTE — TELEPHONE ENCOUNTER
Thank you. I've sent the new prescription for omeprazole 40 mg to his mail order pharmacy. Can hold off on sucralfate at this time.  Please let him know I sent the new prescription, he should stop the pantoprazole once he starts omeprazole and can hold off

## 2022-01-26 NOTE — TELEPHONE ENCOUNTER
Called patient back today and was able to speak with him ,verified  and accepted the date and time ,location ,patient verbalizes understanding of above discussion and plan.     Scheduled for:  Colonoscopy [address] ,[address]   Provider Name:  Donell Chairez

## 2022-01-26 NOTE — TELEPHONE ENCOUNTER
Contacted patient and reviewed message below. All questions answered. Patient verbalized understanding.

## 2022-01-26 NOTE — TELEPHONE ENCOUNTER
KAYE:;   Spoke to patient and was scheduled for Colon and Egd on 4/12/2022 at [facility] @2:15 Pm   Patient gave me the Pharmacy and its [address]   Phone# [phone]  And he stated that he never received the medication that you sent Sucralfate

## 2022-03-19 ENCOUNTER — APPOINTMENT (OUTPATIENT)
Dept: GENERAL RADIOLOGY | Facility: HOSPITAL | Age: 47
End: 2022-03-19
Attending: EMERGENCY MEDICINE
Payer: COMMERCIAL

## 2022-03-19 ENCOUNTER — HOSPITAL ENCOUNTER (EMERGENCY)
Facility: HOSPITAL | Age: 47
Discharge: HOME | End: 2022-03-19
Attending: EMERGENCY MEDICINE
Payer: COMMERCIAL

## 2022-03-19 VITALS
SYSTOLIC BLOOD PRESSURE: 135 MMHG | BODY MASS INDEX: 28.63 KG/M2 | OXYGEN SATURATION: 98 % | HEART RATE: 72 BPM | HEIGHT: 70 IN | RESPIRATION RATE: 18 BRPM | DIASTOLIC BLOOD PRESSURE: 85 MMHG | TEMPERATURE: 98 F | WEIGHT: 200 LBS

## 2022-03-19 DIAGNOSIS — J06.9 VIRAL UPPER RESPIRATORY TRACT INFECTION WITH COUGH: Primary | ICD-10-CM

## 2022-03-19 DIAGNOSIS — D50.8 OTHER IRON DEFICIENCY ANEMIA: ICD-10-CM

## 2022-03-19 LAB
ANION GAP SERPL CALC-SCNC: 7 MMOL/L (ref 0–18)
BASOPHILS # BLD AUTO: 0.05 X10(3) UL (ref 0–0.2)
BASOPHILS NFR BLD AUTO: 0.8 %
BUN BLD-MCNC: 17 MG/DL (ref 7–18)
BUN/CREAT SERPL: 14.8 (ref 10–20)
CALCIUM BLD-MCNC: 8.9 MG/DL (ref 8.5–10.1)
CHLORIDE SERPL-SCNC: 108 MMOL/L (ref 98–112)
CO2 SERPL-SCNC: 27 MMOL/L (ref 21–32)
CREAT BLD-MCNC: 1.15 MG/DL
DEPRECATED RDW RBC AUTO: 37.8 FL (ref 35.1–46.3)
EOSINOPHIL # BLD AUTO: 0.09 X10(3) UL (ref 0–0.7)
EOSINOPHIL NFR BLD AUTO: 1.4 %
ERYTHROCYTE [DISTWIDTH] IN BLOOD BY AUTOMATED COUNT: 15 % (ref 11–15)
GLUCOSE BLD-MCNC: 157 MG/DL (ref 70–99)
HCT VFR BLD AUTO: 37.4 %
HGB BLD-MCNC: 10.6 G/DL
IMM GRANULOCYTES NFR BLD: 0.5 %
LYMPHOCYTES # BLD AUTO: 1.18 X10(3) UL (ref 1–4)
LYMPHOCYTES NFR BLD AUTO: 18.2 %
MCH RBC QN AUTO: 20.1 PG (ref 26–34)
MCHC RBC AUTO-ENTMCNC: 28.3 G/DL (ref 31–37)
MCV RBC AUTO: 70.8 FL
MONOCYTES # BLD AUTO: 0.47 X10(3) UL (ref 0.1–1)
MONOCYTES NFR BLD AUTO: 7.3 %
NEUTROPHILS # BLD AUTO: 4.65 X10 (3) UL (ref 1.5–7.7)
NEUTROPHILS # BLD AUTO: 4.65 X10(3) UL (ref 1.5–7.7)
OSMOLALITY SERPL CALC.SUM OF ELEC: 299 MOSM/KG (ref 275–295)
PLATELET # BLD AUTO: 295 10(3)UL (ref 150–450)
POTASSIUM SERPL-SCNC: 3.7 MMOL/L (ref 3.5–5.1)
RBC # BLD AUTO: 5.28 X10(6)UL
SODIUM SERPL-SCNC: 142 MMOL/L (ref 136–145)
TROPONIN I HIGH SENSITIVITY: 11 NG/L
WBC # BLD AUTO: 6.5 X10(3) UL (ref 4–11)

## 2022-03-19 PROCEDURE — 99284 EMERGENCY DEPT VISIT MOD MDM: CPT

## 2022-03-19 PROCEDURE — 96360 HYDRATION IV INFUSION INIT: CPT

## 2022-03-19 PROCEDURE — 71045 X-RAY EXAM CHEST 1 VIEW: CPT | Performed by: EMERGENCY MEDICINE

## 2022-03-19 PROCEDURE — 84484 ASSAY OF TROPONIN QUANT: CPT | Performed by: EMERGENCY MEDICINE

## 2022-03-19 PROCEDURE — 85025 COMPLETE CBC W/AUTO DIFF WBC: CPT | Performed by: EMERGENCY MEDICINE

## 2022-03-19 PROCEDURE — 93005 ELECTROCARDIOGRAM TRACING: CPT

## 2022-03-19 PROCEDURE — 93010 ELECTROCARDIOGRAM REPORT: CPT | Performed by: EMERGENCY MEDICINE

## 2022-03-19 PROCEDURE — 80048 BASIC METABOLIC PNL TOTAL CA: CPT | Performed by: EMERGENCY MEDICINE

## 2022-03-19 RX ORDER — FLUTICASONE PROPIONATE 50 MCG
1 SPRAY, SUSPENSION (ML) NASAL 2 TIMES DAILY
Qty: 16 G | Refills: 0 | Status: SHIPPED | OUTPATIENT
Start: 2022-03-19

## 2022-03-19 NOTE — ED INITIAL ASSESSMENT (HPI)
Pt reports a productive cough with green sputum x 2 weeks. Pt states that he had a syncopal episode at 2am. Pt denies any pain at this time.  Pt seen at another clinic and given a neb, had a negative flu swab, but was told he had an abnormal ekg

## 2022-04-05 ENCOUNTER — TELEPHONE (OUTPATIENT)
Dept: GASTROENTEROLOGY | Facility: CLINIC | Age: 47
End: 2022-04-05

## 2022-04-09 ENCOUNTER — LAB ENCOUNTER (OUTPATIENT)
Dept: LAB | Facility: HOSPITAL | Age: 47
End: 2022-04-09
Attending: INTERNAL MEDICINE
Payer: COMMERCIAL

## 2022-04-09 DIAGNOSIS — Z01.818 PRE-OP TESTING: ICD-10-CM

## 2022-04-10 LAB — SARS-COV-2 RNA RESP QL NAA+PROBE: NOT DETECTED

## 2022-04-11 ENCOUNTER — TELEPHONE (OUTPATIENT)
Dept: GASTROENTEROLOGY | Facility: CLINIC | Age: 47
End: 2022-04-11

## 2022-04-11 RX ORDER — OMEPRAZOLE 40 MG/1
40 CAPSULE, DELAYED RELEASE ORAL 2 TIMES DAILY
Qty: 180 CAPSULE | Refills: 1 | Status: SHIPPED | OUTPATIENT
Start: 2022-04-11

## 2022-04-11 NOTE — TELEPHONE ENCOUNTER
Paged by patient. Says he didn't get bowel prep. Discussed can send but we reviewed diet today and he notes solid foods for breakfast and lunch. Discussed my concerns with this of not being cleaned out completely even with prep and he may need to repeat so given the indication may be best to reschedule which he is fine with. He now understands need for clear liquid diet entire day prior. Also requesting refill for omeprazole.     GI staff:    Please contact endo and cancel procedures for tomorrow 4/12/22 and reschedule to a different day    Thanks    Cheral Harada, MD  [facility], [facility] - Gastroenterology  4/11/2022  6:14 PM

## 2022-04-12 NOTE — TELEPHONE ENCOUNTER
GI Schedulers--     Please contact patient to reschedule procedure. See TE 1/25/22 for orders. Patient cancelled in Epic, surgical case change request made.

## 2022-04-13 NOTE — TELEPHONE ENCOUNTER
Can you clarify which pharmacy to send to. The one currently selected is his mail order which I would imagine would not work?     Thanks    Jim Rothman MD  [facility], [facility] - Gastroenterology  4/13/2022  6:24 PM

## 2022-04-13 NOTE — TELEPHONE ENCOUNTER
Dr. Jim Huang--    This patient is scheduled    Please send his Golytely prep to the pharmacy on file.  Thank you    You may close this TE when done :)

## 2022-04-14 ENCOUNTER — TELEPHONE (OUTPATIENT)
Dept: GASTROENTEROLOGY | Facility: CLINIC | Age: 47
End: 2022-04-14

## 2022-04-14 NOTE — TELEPHONE ENCOUNTER
Pep sent    Thanks    Matthew Bhardwaj MD  [facility], [facility] - Gastroenterology  4/14/2022  2:01 PM

## 2022-04-14 NOTE — TELEPHONE ENCOUNTER
Dr Darrell Callaway,   I contcated patient and verified his pharmacy of choice is [facility] on [address] and [employer], that is file.  Thank you    May close encounter when done

## 2022-04-15 ENCOUNTER — TELEPHONE (OUTPATIENT)
Dept: GASTROENTEROLOGY | Facility: CLINIC | Age: 47
End: 2022-04-15

## 2022-04-15 NOTE — TELEPHONE ENCOUNTER
Contacted [facility] and clarified instructions per Dr. Andrea Wisdom previous notes should be Omeprazole 40mg - Take 1 capsule by mouth twice a day.

## 2022-04-20 ENCOUNTER — TELEPHONE (OUTPATIENT)
Dept: GASTROENTEROLOGY | Facility: CLINIC | Age: 47
End: 2022-04-20

## 2022-04-20 NOTE — TELEPHONE ENCOUNTER
Contacted patient and discussed that per Dr. Andrea Wisdom notes, patient was to stop pantoprazole and start omeprazole. Patient was initially confused but realized that was correct and he was thinking about famotidine. I informed patient I spoke to [facility] today and they said they shipped out the omeprazole. Patient verbalized understanding. Appreciative of call.

## 2022-04-20 NOTE — TELEPHONE ENCOUNTER
[facility] and spoke to pharmacist Francisco Hussein and informed him patient is no longer taking pantoprazole. They shipped out the omeprazole that was sent in on 4/11/22.

## 2022-07-16 ENCOUNTER — LAB ENCOUNTER (OUTPATIENT)
Dept: LAB | Age: 47
End: 2022-07-16
Attending: INTERNAL MEDICINE
Payer: COMMERCIAL

## 2022-07-16 DIAGNOSIS — Z01.818 PRE-OP TESTING: ICD-10-CM

## 2022-07-17 LAB — SARS-COV-2 RNA RESP QL NAA+PROBE: NOT DETECTED

## 2022-07-19 ENCOUNTER — ANESTHESIA (OUTPATIENT)
Dept: ENDOSCOPY | Age: 47
End: 2022-07-19
Payer: COMMERCIAL

## 2022-07-19 ENCOUNTER — HOSPITAL ENCOUNTER (OUTPATIENT)
Age: 47
Setting detail: HOSPITAL OUTPATIENT SURGERY
Discharge: HOME | End: 2022-07-19
Attending: INTERNAL MEDICINE | Admitting: INTERNAL MEDICINE
Payer: COMMERCIAL

## 2022-07-19 ENCOUNTER — ANESTHESIA EVENT (OUTPATIENT)
Dept: ENDOSCOPY | Age: 47
End: 2022-07-19
Payer: COMMERCIAL

## 2022-07-19 VITALS
HEART RATE: 60 BPM | BODY MASS INDEX: 28.56 KG/M2 | HEIGHT: 71 IN | SYSTOLIC BLOOD PRESSURE: 108 MMHG | DIASTOLIC BLOOD PRESSURE: 75 MMHG | TEMPERATURE: 97 F | RESPIRATION RATE: 20 BRPM | WEIGHT: 204 LBS | OXYGEN SATURATION: 96 %

## 2022-07-19 DIAGNOSIS — Z12.11 SCREEN FOR COLON CANCER: ICD-10-CM

## 2022-07-19 DIAGNOSIS — K22.70 BARRETT'S ESOPHAGUS WITHOUT DYSPLASIA: ICD-10-CM

## 2022-07-19 DIAGNOSIS — Z01.818 PRE-OP TESTING: Primary | ICD-10-CM

## 2022-07-19 DIAGNOSIS — Z12.12 SCREENING FOR CANCER OF THE RECTUM: ICD-10-CM

## 2022-07-19 DIAGNOSIS — K21.00 GASTROESOPHAGEAL REFLUX DISEASE WITH ESOPHAGITIS, UNSPECIFIED WHETHER HEMORRHAGE: ICD-10-CM

## 2022-07-19 PROCEDURE — 99070 SPECIAL SUPPLIES PHYS/QHP: CPT | Performed by: INTERNAL MEDICINE

## 2022-07-19 PROCEDURE — 43239 EGD BIOPSY SINGLE/MULTIPLE: CPT | Performed by: INTERNAL MEDICINE

## 2022-07-19 PROCEDURE — 88312 SPECIAL STAINS GROUP 1: CPT | Performed by: INTERNAL MEDICINE

## 2022-07-19 PROCEDURE — 88341 IMHCHEM/IMCYTCHM EA ADD ANTB: CPT | Performed by: INTERNAL MEDICINE

## 2022-07-19 PROCEDURE — 45385 COLONOSCOPY W/LESION REMOVAL: CPT | Performed by: INTERNAL MEDICINE

## 2022-07-19 PROCEDURE — 88342 IMHCHEM/IMCYTCHM 1ST ANTB: CPT | Performed by: INTERNAL MEDICINE

## 2022-07-19 PROCEDURE — 88305 TISSUE EXAM BY PATHOLOGIST: CPT | Performed by: INTERNAL MEDICINE

## 2022-07-19 RX ORDER — SODIUM CHLORIDE, SODIUM LACTATE, POTASSIUM CHLORIDE, CALCIUM CHLORIDE 600; 310; 30; 20 MG/100ML; MG/100ML; MG/100ML; MG/100ML
INJECTION, SOLUTION INTRAVENOUS CONTINUOUS
Status: DISCONTINUED | OUTPATIENT
Start: 2022-07-19 | End: 2022-07-19

## 2022-07-19 RX ORDER — LIDOCAINE HYDROCHLORIDE 10 MG/ML
INJECTION, SOLUTION EPIDURAL; INFILTRATION; INTRACAUDAL; PERINEURAL AS NEEDED
Status: DISCONTINUED | OUTPATIENT
Start: 2022-07-19 | End: 2022-07-19 | Stop reason: SURG

## 2022-07-19 RX ORDER — GLYCOPYRROLATE 0.2 MG/ML
INJECTION, SOLUTION INTRAMUSCULAR; INTRAVENOUS AS NEEDED
Status: DISCONTINUED | OUTPATIENT
Start: 2022-07-19 | End: 2022-07-19 | Stop reason: SURG

## 2022-07-19 RX ADMIN — SODIUM CHLORIDE, SODIUM LACTATE, POTASSIUM CHLORIDE, CALCIUM CHLORIDE: 600; 310; 30; 20 INJECTION, SOLUTION INTRAVENOUS at 13:00:00

## 2022-07-19 RX ADMIN — GLYCOPYRROLATE 0.2 MG: 0.2 INJECTION, SOLUTION INTRAMUSCULAR; INTRAVENOUS at 13:03:00

## 2022-07-19 RX ADMIN — SODIUM CHLORIDE, SODIUM LACTATE, POTASSIUM CHLORIDE, CALCIUM CHLORIDE: 600; 310; 30; 20 INJECTION, SOLUTION INTRAVENOUS at 13:28:00

## 2022-07-19 RX ADMIN — LIDOCAINE HYDROCHLORIDE 50 MG: 10 INJECTION, SOLUTION EPIDURAL; INFILTRATION; INTRACAUDAL; PERINEURAL at 13:03:00

## 2022-07-20 ENCOUNTER — TELEPHONE (OUTPATIENT)
Dept: GASTROENTEROLOGY | Facility: CLINIC | Age: 47
End: 2022-07-20

## 2022-07-20 NOTE — TELEPHONE ENCOUNTER
Dr. Darrell Callaway,    I spoke to patient states he is doing much better today. No fever,eating something and taking tylenol helped with symptoms. He did have questions regarding results, wants to know condition of Justin's. Also notes he has been having a new issue of waking up in the middle of the night coughing due to saliva going down esophagus. He wanted to know if you saw anything unusual during procedure that could be causing this. I let him know we would follow up once pathology report is reviewed.

## 2022-07-20 NOTE — TELEPHONE ENCOUNTER
Late entry note:    -Patient's wife called yesterday, patient had EGD/CLN with Dr. Gray Ventura  -Low grade temp last night 100.0, no chills/rigors/etc.  -no ab pain, no nausea/vomiting  -no cp/sob  I asked them to try tylenol x1 and see how he does in theAM. He was out in the sun yesterday post-procedure.       GI Staff:   Can you call patient to see how he is doing and then notify Dr. Gray Ventura, thanks      [address] [address] Ventura

## 2022-07-20 NOTE — TELEPHONE ENCOUNTER
Please let him know the following. Glad to hear he is feeling better. His esophagus looked much better and the inflammation is gone and healed on the omeprazole which he should continue  The Justin's looked minimal   I'm not sure why he is having the coughing, I'm not sure I can relate it to anything I saw. Would have him discuss it with his primary physician.     Thanks    Gabriela Warren MD  [facility] - Gastroenterology  7/20/2022  3:07 PM

## 2022-07-21 NOTE — TELEPHONE ENCOUNTER
Contacted patient and verified . Reviewed note below which patient verbalized understanding. Patient will wait for biopsy results which are still in process.

## 2022-07-22 ENCOUNTER — TELEPHONE (OUTPATIENT)
Dept: GASTROENTEROLOGY | Facility: CLINIC | Age: 47
End: 2022-07-22

## 2022-07-22 NOTE — TELEPHONE ENCOUNTER
Health maintenance updated. Last colonoscopy done 7/19/22. 10 year recall placed into [facility], next due on 7/19/32 per Dr. Rebecca Gagnon.

## 2022-07-22 NOTE — TELEPHONE ENCOUNTER
GI staff: please place recall in for colonoscopy in 10 years     Then close encounter    Thanks    Tal Salomon MD  [address] [place] - Gastroenterology  7/22/2022  12:58 PM

## 2022-07-29 RX ORDER — OMEPRAZOLE 40 MG/1
CAPSULE, DELAYED RELEASE ORAL
Qty: 180 CAPSULE | Refills: 3 | Status: SHIPPED | OUTPATIENT
Start: 2022-07-29

## 2022-08-27 ENCOUNTER — HOSPITAL ENCOUNTER (OUTPATIENT)
Age: 47
Discharge: HOME | End: 2022-08-27
Payer: COMMERCIAL

## 2022-08-27 VITALS
TEMPERATURE: 98 F | HEART RATE: 80 BPM | DIASTOLIC BLOOD PRESSURE: 75 MMHG | SYSTOLIC BLOOD PRESSURE: 120 MMHG | RESPIRATION RATE: 22 BRPM | OXYGEN SATURATION: 100 %

## 2022-08-27 DIAGNOSIS — R05.1 ACUTE COUGH: Primary | ICD-10-CM

## 2022-08-27 DIAGNOSIS — U07.1 COVID-19: ICD-10-CM

## 2022-08-27 PROCEDURE — 99213 OFFICE O/P EST LOW 20 MIN: CPT

## 2022-08-27 RX ORDER — BENZONATATE 100 MG/1
100 CAPSULE ORAL 3 TIMES DAILY PRN
Qty: 30 CAPSULE | Refills: 0 | Status: SHIPPED | OUTPATIENT
Start: 2022-08-27 | End: 2022-09-26

## 2022-08-27 NOTE — ED INITIAL ASSESSMENT (HPI)
Patient reports being ill for the last 3 weeks. + covid for at least 2 weeks. Reports head/nasal pressure, sore eyes, headache, lung congestion, muscle soreness, shortness of breath, fatigue, wheezing and body aches.

## 2022-12-01 ENCOUNTER — TELEPHONE (OUTPATIENT)
Facility: CLINIC | Age: 47
End: 2022-12-01

## 2022-12-01 NOTE — TELEPHONE ENCOUNTER
Sorry, it should NOT be a problem    Thanks    Paula Padgett MD  [address] - Gastroenterology  12/1/2022  1:48 PM

## 2022-12-01 NOTE — TELEPHONE ENCOUNTER
I agree with the triage and that he needs to try to be more adherent to the dosing of the omeprazole. I don't believe he requires an EGD at this time. I think with adherence to the meds it should improve over the next 4-6 weeks.  The  also in general should be a large problem    Thanks    Marian Madden MD  [address] - Gastroenterology  12/1/2022  1:12 PM

## 2022-12-01 NOTE — TELEPHONE ENCOUNTER
Dr. Camilla Aquino,    I just want to clarify you mean that the  being different is or is not a large problem, thank you!

## 2022-12-01 NOTE — TELEPHONE ENCOUNTER
Dr. Alia Cuellar,    I spoke to patient and his wife Rachel Ibrahim. States his acid reflux has been bad ever since eating pumpkin pie cheesecake about a week ago. Coughing at night. I advised the importance to avoid trigger foods, staying upright 2-3 hours after meals, and taking omeprazole BID. Patient's wife states he is \"hard headed\" and doesn't always follow diet recommendations or take omeprazole twice daily. Want to know if EGD is needed. They wanted ask you if change in  for omeprazole changes the effectiveness of the medication. States previously they had a reddish/bown pill and now is purple and white. I told her previously he was on pantoprazole which could be the change but she states it is omeprazole. Please advise, thank you!

## 2022-12-01 NOTE — TELEPHONE ENCOUNTER
Contacted patient's wife Mario Pickens (on [facility]) and reviewed note below. She verbalized understanding and will relay to patient who was on the phone next to her.

## 2023-02-28 RX ORDER — OMEPRAZOLE 40 MG/1
40 CAPSULE, DELAYED RELEASE ORAL 2 TIMES DAILY
Qty: 180 CAPSULE | Refills: 3 | Status: SHIPPED | OUTPATIENT
Start: 2023-02-28

## 2023-02-28 NOTE — TELEPHONE ENCOUNTER
Dr. Edgard Castaneda,    Patient is requesting refill for omeprazole to be sent to [facility] but requesting we state  to be [employer]. Please advise, thank you.

## 2023-02-28 NOTE — TELEPHONE ENCOUNTER
Pt is calling in regards to the omeprazole 40 mg       [facility] is not with [facility] anymore in their network. Lisa changed their manufacture for omeprazole and pt states it does not works as well. Pt wants to have script sent to [facility] due to the manufacture \"[employer]\". [facility] stated that if  writes the script for this specific manufacter, [facility] would be able to fill it under his insurance.

## 2023-03-12 ENCOUNTER — HOSPITAL ENCOUNTER (EMERGENCY)
Facility: HOSPITAL | Age: 48
Discharge: HOME | End: 2023-03-12
Attending: EMERGENCY MEDICINE
Payer: COMMERCIAL

## 2023-03-12 ENCOUNTER — APPOINTMENT (OUTPATIENT)
Dept: GENERAL RADIOLOGY | Facility: HOSPITAL | Age: 48
End: 2023-03-12
Attending: EMERGENCY MEDICINE
Payer: COMMERCIAL

## 2023-03-12 VITALS
RESPIRATION RATE: 16 BRPM | TEMPERATURE: 99 F | HEART RATE: 75 BPM | DIASTOLIC BLOOD PRESSURE: 79 MMHG | OXYGEN SATURATION: 100 % | SYSTOLIC BLOOD PRESSURE: 116 MMHG

## 2023-03-12 DIAGNOSIS — J18.9 COMMUNITY ACQUIRED PNEUMONIA OF RIGHT UPPER LOBE OF LUNG: Primary | ICD-10-CM

## 2023-03-12 LAB
S PYO AG THROAT QL: POSITIVE
SARS-COV-2 RNA RESP QL NAA+PROBE: NOT DETECTED

## 2023-03-12 PROCEDURE — 94640 AIRWAY INHALATION TREATMENT: CPT

## 2023-03-12 PROCEDURE — 99284 EMERGENCY DEPT VISIT MOD MDM: CPT

## 2023-03-12 PROCEDURE — 71045 X-RAY EXAM CHEST 1 VIEW: CPT | Performed by: EMERGENCY MEDICINE

## 2023-03-12 PROCEDURE — 87880 STREP A ASSAY W/OPTIC: CPT

## 2023-03-12 RX ORDER — IPRATROPIUM BROMIDE AND ALBUTEROL SULFATE 2.5; .5 MG/3ML; MG/3ML
3 SOLUTION RESPIRATORY (INHALATION) ONCE
Status: COMPLETED | OUTPATIENT
Start: 2023-03-12 | End: 2023-03-12

## 2023-03-12 RX ORDER — ALBUTEROL SULFATE 90 UG/1
2 AEROSOL, METERED RESPIRATORY (INHALATION) EVERY 4 HOURS PRN
Qty: 1 EACH | Refills: 0 | Status: SHIPPED | OUTPATIENT
Start: 2023-03-12 | End: 2023-04-11

## 2023-03-12 RX ORDER — LEVOFLOXACIN 750 MG/1
750 TABLET ORAL DAILY
Qty: 5 TABLET | Refills: 0 | Status: SHIPPED | OUTPATIENT
Start: 2023-03-12 | End: 2023-03-17

## 2023-03-12 NOTE — ED QUICK NOTES
Patient safe to discharge home per ER MD. Discharge education provided, including follow up instructions. Patient verbalizes understanding. Family at bedside.

## 2023-06-05 ENCOUNTER — TELEPHONE (OUTPATIENT)
Facility: CLINIC | Age: 48
End: 2023-06-05

## 2023-06-05 NOTE — TELEPHONE ENCOUNTER
Patients wife is calling because patients primary ask her to contact gi due to possible concern for bleeding in gi tract.

## 2023-06-05 NOTE — TELEPHONE ENCOUNTER
Spoke to patients spouse [facility] patient had annual check up with PCP pn 6/2/23 and was informed his Hgb, hematocrit, Mcv, Mch and mchc came back low (coped and pasted below from recent 6/2/23 labs under care everywhere). [facility] pcp is unsure what is causing his anemia. Is recommending patient have a colonoscopy and EGD. Requesting patient to be seen prior to next available. Please advise. Thank you.

## 2023-06-05 NOTE — TELEPHONE ENCOUNTER
Please find out if his primary physician placed him on any oral iron for this. If not, I would like him to complete iron studies and repeat his hemoglobin which I will order for him. Please also make sure he is taking his omeprazole 40 mg BID. I would like to see him in the office asap. Please offer an MD approval/24 hour reserve asap. Please let him know if he has melena (not related to dark stool from oral iron) or persistent blood per rectum, he should present to the ED.     Thank you    Dorien Mcburney, MD  [address] - Gastroenterology  6/5/2023  5:49 PM

## 2023-06-06 NOTE — TELEPHONE ENCOUNTER
Dr. Nany Guzman,    I spoke to patient states he started taking oral iron otc yesterday. I made an appt for him to see you on 6/22 (next available res24/md approval). Aware to present to ED if having dark stools/bleeding.

## 2023-06-22 ENCOUNTER — OFFICE VISIT (OUTPATIENT)
Dept: GASTROENTEROLOGY | Facility: CLINIC | Age: 48
End: 2023-06-22

## 2023-06-22 VITALS
BODY MASS INDEX: 28.84 KG/M2 | DIASTOLIC BLOOD PRESSURE: 70 MMHG | HEART RATE: 76 BPM | SYSTOLIC BLOOD PRESSURE: 106 MMHG | WEIGHT: 206 LBS | HEIGHT: 71 IN

## 2023-06-22 DIAGNOSIS — D50.9 IRON DEFICIENCY ANEMIA, UNSPECIFIED IRON DEFICIENCY ANEMIA TYPE: Primary | ICD-10-CM

## 2023-06-22 PROCEDURE — 99214 OFFICE O/P EST MOD 30 MIN: CPT | Performed by: INTERNAL MEDICINE

## 2023-06-22 PROCEDURE — 3008F BODY MASS INDEX DOCD: CPT | Performed by: INTERNAL MEDICINE

## 2023-06-22 PROCEDURE — 3078F DIAST BP <80 MM HG: CPT | Performed by: INTERNAL MEDICINE

## 2023-06-22 PROCEDURE — 3074F SYST BP LT 130 MM HG: CPT | Performed by: INTERNAL MEDICINE

## 2023-06-22 NOTE — PATIENT INSTRUCTIONS
1. Schedule colonoscopy and EGD with MAC at the [facility]    2.  bowel prep from pharmacy (split trilyte or golytely). As we discussed it is important to take the bowel preparation in two parts taking 2L of the liquid the night before the procedure and the second 2L the morning of the procedure starting approximately 6 hours prior to your scheduled procedure time. 3. Continue all medications for procedure    4. Read all bowel prep instructions carefully    5. AVOID seeds, nuts, popcorn, raw fruits and vegetables (cooked is okay) for 2-3 days before procedure    >>>Please note: if you were prescribed a bowel prep and it is too expensive or not covered by insurance, it is okay to substitute Trilyte or Golytely (or any similar generic prep). This can be done by notifying the pharmacy or calling our office.

## 2023-06-28 ENCOUNTER — TELEPHONE (OUTPATIENT)
Facility: CLINIC | Age: 48
End: 2023-06-28

## 2023-06-28 DIAGNOSIS — D50.9 IRON DEFICIENCY ANEMIA, UNSPECIFIED IRON DEFICIENCY ANEMIA TYPE: Primary | ICD-10-CM

## 2023-06-28 DIAGNOSIS — K21.9 GASTROESOPHAGEAL REFLUX DISEASE, UNSPECIFIED WHETHER ESOPHAGITIS PRESENT: ICD-10-CM

## 2023-08-24 ENCOUNTER — TELEPHONE (OUTPATIENT)
Facility: CLINIC | Age: 48
End: 2023-08-24

## 2023-09-13 ENCOUNTER — TELEPHONE (OUTPATIENT)
Facility: CLINIC | Age: 48
End: 2023-09-13

## 2023-09-20 PROBLEM — D50.9 IRON DEFICIENCY ANEMIA: Status: ACTIVE | Noted: 2023-09-20

## 2023-12-13 ENCOUNTER — HOSPITAL ENCOUNTER (OUTPATIENT)
Age: 48
Discharge: HOME | End: 2023-12-13
Payer: COMMERCIAL

## 2023-12-13 VITALS
OXYGEN SATURATION: 98 % | RESPIRATION RATE: 18 BRPM | HEART RATE: 64 BPM | SYSTOLIC BLOOD PRESSURE: 126 MMHG | TEMPERATURE: 98 F | DIASTOLIC BLOOD PRESSURE: 79 MMHG
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
## 2023-12-13 DIAGNOSIS — S01.311A LACERATION OF RIGHT EAR LOBE, INITIAL ENCOUNTER: ICD-10-CM

## 2023-12-13 PROCEDURE — 99213 OFFICE O/P EST LOW 20 MIN: CPT

## 2023-12-13 PROCEDURE — 90471 IMMUNIZATION ADMIN: CPT

## 2023-12-13 PROCEDURE — 12011 RPR F/E/E/N/L/M 2.5 CM/<: CPT

## 2023-12-13 PROCEDURE — 99214 OFFICE O/P EST MOD 30 MIN: CPT

## 2023-12-13 RX ORDER — AMOXICILLIN AND CLAVULANATE POTASSIUM 875; 125 MG/1; MG/1
1 TABLET, FILM COATED ORAL 2 TIMES DAILY
Qty: 10 TABLET | Refills: 0 | Status: SHIPPED | OUTPATIENT
Start: 2023-12-13 | End: 2023-12-18

## 2023-12-13 NOTE — DISCHARGE INSTRUCTIONS
Clean the wound with soap and water. Tylenol or Motrin as needed for pain. Cool compresses to the ear. Keep the area clean and dry. Take Augmentin twice a day to prevent infection.   Follow-up with your doctor if any concerns

## 2023-12-16 ENCOUNTER — HOSPITAL ENCOUNTER (OUTPATIENT)
Age: 48
Discharge: HOME | End: 2023-12-16
Payer: COMMERCIAL

## 2023-12-16 VITALS
DIASTOLIC BLOOD PRESSURE: 68 MMHG | RESPIRATION RATE: 18 BRPM | TEMPERATURE: 102 F | SYSTOLIC BLOOD PRESSURE: 126 MMHG | OXYGEN SATURATION: 97 % | HEART RATE: 95 BPM

## 2023-12-16 DIAGNOSIS — J10.1 INFLUENZA A: Primary | ICD-10-CM

## 2023-12-16 LAB
POCT INFLUENZA A: POSITIVE
POCT INFLUENZA B: NEGATIVE
S PYO AG THROAT QL IA.RAPID: NEGATIVE
SARS-COV-2 RNA RESP QL NAA+PROBE: NOT DETECTED

## 2023-12-16 PROCEDURE — 87651 STREP A DNA AMP PROBE: CPT | Performed by: NURSE PRACTITIONER

## 2023-12-16 PROCEDURE — 87502 INFLUENZA DNA AMP PROBE: CPT | Performed by: NURSE PRACTITIONER

## 2023-12-16 PROCEDURE — 99214 OFFICE O/P EST MOD 30 MIN: CPT

## 2023-12-16 PROCEDURE — 99213 OFFICE O/P EST LOW 20 MIN: CPT

## 2023-12-16 RX ORDER — OSELTAMIVIR PHOSPHATE 75 MG/1
75 CAPSULE ORAL 2 TIMES DAILY
Qty: 10 CAPSULE | Refills: 0 | Status: SHIPPED | OUTPATIENT
Start: 2023-12-16 | End: 2023-12-21

## 2023-12-16 RX ORDER — IBUPROFEN 400 MG/1
800 TABLET ORAL ONCE
Status: COMPLETED | OUTPATIENT
Start: 2023-12-16 | End: 2023-12-16

## 2023-12-16 NOTE — ED INITIAL ASSESSMENT (HPI)
Patient with heartburn a couple days ago and then developed cough and sore throat. On abx for cat scratch to right ear.     Today with head congestion and bodyaches

## 2023-12-16 NOTE — DISCHARGE INSTRUCTIONS
Tylenol take 2 tablets every 4-6 hours  Ibuprofen 600 mg every 6-8 hours  Follow-up with your primary care doctor in 2-3 days  Return for any new or worsening symptoms at any time

## 2024-03-26 ENCOUNTER — APPOINTMENT (OUTPATIENT)
Dept: GENERAL RADIOLOGY | Age: 49
End: 2024-03-26
Attending: EMERGENCY MEDICINE
Payer: COMMERCIAL

## 2024-03-26 ENCOUNTER — HOSPITAL ENCOUNTER (OUTPATIENT)
Age: 49
Discharge: HOME | End: 2024-03-26
Attending: EMERGENCY MEDICINE
Payer: COMMERCIAL

## 2024-03-26 VITALS
SYSTOLIC BLOOD PRESSURE: 113 MMHG | TEMPERATURE: 97 F | DIASTOLIC BLOOD PRESSURE: 64 MMHG | OXYGEN SATURATION: 98 % | RESPIRATION RATE: 18 BRPM | HEART RATE: 75 BPM

## 2024-03-26 DIAGNOSIS — J40 BRONCHITIS: Primary | ICD-10-CM

## 2024-03-26 PROCEDURE — 99213 OFFICE O/P EST LOW 20 MIN: CPT

## 2024-03-26 PROCEDURE — 99214 OFFICE O/P EST MOD 30 MIN: CPT

## 2024-03-26 PROCEDURE — 71046 X-RAY EXAM CHEST 2 VIEWS: CPT | Performed by: EMERGENCY MEDICINE

## 2024-03-26 RX ORDER — BENZONATATE 100 MG/1
100 CAPSULE ORAL 3 TIMES DAILY PRN
Qty: 30 CAPSULE | Refills: 0 | Status: SHIPPED | OUTPATIENT
Start: 2024-03-26 | End: 2024-04-25

## 2024-03-26 NOTE — ED PROVIDER NOTES
Patient Seen in: [facility]      History     Chief Complaint   Patient presents with    Cough/URI     Stated Complaint: cough and whezzing    Subjective:   HPI    47 yo male with two months of cough. No fever. Feels his chest is crackling. Worried about pneumonia. No fever. No pulmonary disease.     Objective:   Past Medical History:   Diagnosis Date    Anemia     Justin esophagus 02/11/2020    Blood disorder     Disorder of thyroid     Esophageal reflux     H/O gastroesophageal reflux (GERD)     Thyroid disease     Vitiligo               No pertinent past surgical history.              No pertinent social history.            Review of Systems    Positive for stated complaint: cough and whezzing  Other systems are as noted in HPI.  Constitutional and vital signs reviewed.      All other systems reviewed and negative except as noted above.    Physical Exam     ED Triage Vitals [03/26/24 0838]   /64   Pulse 75   Resp 18   Temp 97.4 °F (36.3 °C)   Temp src Temporal   SpO2 98 %   O2 Device None (Room air)       Current:/64   Pulse 75   Temp 97.4 °F (36.3 °C) (Temporal)   Resp 18   SpO2 98%         Physical Exam  Vitals and nursing note reviewed.   Constitutional:       Appearance: Normal appearance. He is well-developed.   HENT:      Head: Normocephalic and atraumatic.      Nose: Nose normal.   Cardiovascular:      Rate and Rhythm: Normal rate and regular rhythm.      Heart sounds: Normal heart sounds.   Pulmonary:      Effort: Pulmonary effort is normal. No respiratory distress.      Comments: Slightly decreased air exchange  Skin:     General: Skin is warm and dry.      Capillary Refill: Capillary refill takes less than 2 seconds.   Neurological:      General: No focal deficit present.      Mental Status: He is alert.      Sensory: No sensory deficit.   Psychiatric:         Mood and Affect: Mood normal.         Behavior: Behavior normal.              ED Course   Labs Reviewed - No data  to display                   MDM                                      Medical Decision Making  Bronchitis vs pneumonia. Cxr images reviewed by myself. No pneumonia. Discharge on benzonatate for cough.     Disposition and Plan     Clinical Impression:  1. Bronchitis         Disposition:  Discharge  3/26/2024  9:10 am    Follow-up:  Virchow, Jay  [address]  [address]  [place] [address] [phone]      As needed          Medications Prescribed:  Discharge Medication List as of 3/26/2024  9:14 AM        START taking these medications    Details   benzonatate 100 MG Oral Cap Take 1 capsule (100 mg total) by mouth 3 (three) times daily as needed for cough., Normal, Disp-30 capsule, R-0

## 2024-03-26 NOTE — ED INITIAL ASSESSMENT (HPI)
Patient with 2 month hx of productive cough.  States he heard \"crackling from his chest  yesterday.\"  Denies fevers.  Denies hx of asthma.

## 2024-05-03 ENCOUNTER — APPOINTMENT (OUTPATIENT)
Dept: URBAN - METROPOLITAN AREA CLINIC 248 | Age: 49
Setting detail: DERMATOLOGY
End: 2024-05-03

## 2024-05-03 DIAGNOSIS — L80 VITILIGO: ICD-10-CM

## 2024-05-03 DIAGNOSIS — L30.8 OTHER SPECIFIED DERMATITIS: ICD-10-CM

## 2024-05-03 PROCEDURE — OTHER PRESCRIPTION: OTHER

## 2024-05-03 PROCEDURE — OTHER DEFER: OTHER

## 2024-05-03 PROCEDURE — OTHER MIPS QUALITY: OTHER

## 2024-05-03 PROCEDURE — OTHER COUNSELING: OTHER

## 2024-05-03 PROCEDURE — OTHER ADDITIONAL NOTES: OTHER

## 2024-05-03 PROCEDURE — OTHER COUNSELING: TOPICAL STEROIDS: OTHER

## 2024-05-03 PROCEDURE — OTHER PRESCRIPTION MEDICATION MANAGEMENT: OTHER

## 2024-05-03 PROCEDURE — 99204 OFFICE O/P NEW MOD 45 MIN: CPT

## 2024-05-03 RX ORDER — CLOBETASOL PROPIONATE 0.5 MG/G
CREAM TOPICAL BID
Qty: 45 | Refills: 3 | Status: ERX | COMMUNITY
Start: 2024-05-03

## 2024-05-03 ASSESSMENT — LOCATION SIMPLE DESCRIPTION DERM
LOCATION SIMPLE: LEFT EYEBROW
LOCATION SIMPLE: RIGHT HAND
LOCATION SIMPLE: LEFT HAND
LOCATION SIMPLE: RIGHT EYEBROW

## 2024-05-03 ASSESSMENT — LOCATION ZONE DERM
LOCATION ZONE: HAND
LOCATION ZONE: FACE

## 2024-05-03 ASSESSMENT — LOCATION DETAILED DESCRIPTION DERM
LOCATION DETAILED: LEFT ULNAR DORSAL HAND
LOCATION DETAILED: LEFT LATERAL EYEBROW
LOCATION DETAILED: RIGHT LATERAL EYEBROW
LOCATION DETAILED: RIGHT THENAR EMINENCE
LOCATION DETAILED: RIGHT RADIAL DORSAL HAND

## 2024-05-03 NOTE — PROCEDURE: ADDITIONAL NOTES
Render Risk Assessment In Note?: no
Additional Notes: Tried and failed Vaseline
Detail Level: Simple

## 2024-05-03 NOTE — PROCEDURE: DEFER
needed assist
Size Of Lesion In Cm (Optional): 0
Introduction Text (Please End With A Colon): The following procedure was deferred: ilk
Detail Level: Simple

## 2024-05-03 NOTE — PROCEDURE: PRESCRIPTION MEDICATION MANAGEMENT
Detail Level: Zone
Render In Strict Bullet Format?: No
Initiate Treatment: clobetasol 0.05 % topical cream BID\\nQuantity: 45.0 g\\nSig: Apply twice daily to eczema up to 2 weeks/month as needed.

## 2024-05-03 NOTE — PROCEDURE: MIPS QUALITY
Quality 394a: Meningococcal Immunizations For Adolescents: Patient had one dose of meningococcal vaccine (serogroups A, C, W, Y) on or between the patient's 11th and 13th birthdays.
Quality 226: Preventive Care And Screening: Tobacco Use: Screening And Cessation Intervention: Patient screened for tobacco use and is an ex/non-smoker
Detail Level: Detailed
Quality 394b: Td/Tdap Immunizations For Adolescents: Patient had one tetanus, diphtheria toxoids and acellular pertussis vaccine (Tdap) on or between the patient's 10th and 13th birthdays.
Quality 431: Preventive Care And Screening: Unhealthy Alcohol Use - Screening: Patient not screened for unhealthy alcohol use using a systematic screening method

## 2024-05-22 ENCOUNTER — TELEPHONE (OUTPATIENT)
Facility: CLINIC | Age: 49
End: 2024-05-22

## 2024-05-22 ENCOUNTER — OFFICE VISIT (OUTPATIENT)
Facility: CLINIC | Age: 49
End: 2024-05-22

## 2024-05-22 VITALS
BODY MASS INDEX: 30.16 KG/M2 | DIASTOLIC BLOOD PRESSURE: 82 MMHG | HEART RATE: 76 BPM | SYSTOLIC BLOOD PRESSURE: 125 MMHG | HEIGHT: 70 IN | WEIGHT: 210.69 LBS

## 2024-05-22 DIAGNOSIS — D50.9 IRON DEFICIENCY ANEMIA, UNSPECIFIED IRON DEFICIENCY ANEMIA TYPE: Primary | ICD-10-CM

## 2024-05-22 DIAGNOSIS — D50.0 IRON DEFICIENCY ANEMIA DUE TO CHRONIC BLOOD LOSS: ICD-10-CM

## 2024-05-22 DIAGNOSIS — K21.00 GASTROESOPHAGEAL REFLUX DISEASE WITH ESOPHAGITIS WITHOUT HEMORRHAGE: Primary | ICD-10-CM

## 2024-05-22 PROCEDURE — 99215 OFFICE O/P EST HI 40 MIN: CPT | Performed by: INTERNAL MEDICINE

## 2024-05-22 RX ORDER — CLOBETASOL PROPIONATE 0.5 MG/G
0.05 CREAM TOPICAL 2 TIMES DAILY
COMMUNITY
Start: 2024-05-03

## 2024-05-22 RX ORDER — ALBUTEROL SULFATE 90 UG/1
2 AEROSOL, METERED RESPIRATORY (INHALATION) EVERY 4 HOURS
COMMUNITY
Start: 2024-03-06

## 2024-05-22 NOTE — TELEPHONE ENCOUNTER
GI RNs-    Please call Oniel and his wife to advise that I do not see a recent urineanalysis to be sure that he is not losing blood from his urine meaning bladder and kidneys.    If he is coming by the lab, I would recommend repeating the iron studies as it has been 3 months and checking his liver enzymes which have not been checked in 5 years.    Labs ordered under today's office visit encounter    - eileen

## 2024-05-22 NOTE — PROGRESS NOTES
** Scroll down for HPI. **    ASSESSMENT/PLAN:     48 year old healthy gentleman, moderately elevated BMI 30.2 on visit of 5/22/2024 who is seen today for follow-up of a previous history of severe GERD symptoms and active reflux esophagitis with ulcerations on EGD examination; then unexpected development of severe iron deficiency anemia June 2023.    Treated with iron infusions for the iron deficiency.    3 EGD examinations and 2 EGD/colonoscopy examinations completed 6/5/2019, 2/11/2020, 1/25/2022, 7/19/2022, 9/20/2023.    Repeat exams as above have shown reflux esophagitis with friable linear erosions to zak esophageal ulcers; small gastric glandular polyp; repeatedly normal colonoscopy examination with ileoscopy.  Most recent exam 9/20/2023 was EGD/colonoscopy exam.    No H. pylori on gastric biopsies/polyps.    Normal biopsies of duodenum/terminal ileum.    Question of some intestinal metaplasia/short segment Justin's on initial biopsies 2/11/2020.    See laboratory trends in HPI below.    Mr. Christine and his partner return for follow-up 5/22/2024 asking for further evaluation of the recent iron deficiency anemia.    Suggest:    Severe iron deficiency anemia  Treated either here or elsewhere with what he recalls to be 10 intravenous iron infusions after June 2023 for this concern.  Repeated EGD and colonoscopy examinations showing only reflux esophagitis with linear esophageal erosions and ulcerations  Indefinite PPI therapy as below  Less likely possible explanations for this anemia after otherwise reassuring EGD and colonoscopy examinations would include malabsorption, small bowel Crohn's disease small bowel AVM lesions small bowel neoplasm  Biopsies of duodenal and terminal ileal mucosa have back completely normal  Low-normal iron studies on most recent labs 2/23/2024  Continue to monitor CBC and iron studies  Caution with aspirin, NSAIDs  Check UA  Schedule video capsule enteroscopy (VCE) small  intestine      2.  Severe GERD symptoms with reflux esophagitis, esophageal erosions and ulceration; 1 biopsy showing intestinal metaplasia February 2020  Subsequent biopsies show only reflux esophagitis  Sounds like some variability in PPI dosing; may have come off it after 1 or more of the repeated exams past 5 years  Currently agreeable to taking the omeprazole 40 mg at least once daily; could consider stepping back up to twice daily as per clinical course  Not taking HCl supplement as below; continue to monitor    3.  Colon cancer screening, average risk  Diminutive hyperplastic colon polyp removed during colonoscopy examination 7/19/2022  No colon polyps on subsequent colonoscopy examination 9/20/2023  Next colonoscopy recommended July 2032 or as per clinical course        Schedule small bowel video capsule enteroscopy examination as above  Indefinite PPI therapy  Continue to monitor CBC, iron studies      Office visit 5/22/2024:  HPI:    Patient ID: Oniel Christine is a 48 year old gentleman mildly elevated BMI 30.2 with a complex history of severe GERD symptoms and reflux esophagitis on exams going back to 2020 as below, question of short segment Justin's esophagus or intestinal metaplasia of the gastric cardia.    As per previous notes, Mr. Christine turned up with severe iron deficiency anemia on labs of 6/2/2023 with hemoglobin 7.9g MCV 57.2.  Iron indices consistent with iron deficiency.    Unclear whether he went off the PPI medication after a follow-up EGD examination showed interval resolution of previous severe reflux esophagitis.    Treated either here or elsewhere with what he recalls to be 10 intravenous iron infusions for this concern.  Not transfused.    Only symptom of the worsening anemia was worsening fatigue.    Four EGD examinations have been performed past 4 years, two of which were pan-endoscopy examinations.  So far the only etiology identified for chronic blood loss anemia is severe reflux  esophagitis.  Reassuring colonoscopy examination.    On review of systems, Mr. Christine describes frequent GERD symptoms, previously severe before starting the PPI medication.  His wife describes him taking up to 10 Tums per evening on the bad nights.  He occasionally awakens coughing and choking.    Otherwise no abdominal pain, certainly no chronic abdominal symptoms no diarrhea.  He sees rare small amounts of blood with bowel movements and wiping suggestive of hemorrhoidal type bleeding.    Weights have been healthy/stable.    Mr. Christine describes good compliance with daily omeprazole medication.  Has researched GERD and digestion and is taking betaine HCl.    Mr. Christine and his wife return today to discuss the iron deficiency and completing the workup.  They are concerned with the disproportionate findings so far of only reflux esophagitis.  They have researched acid suppression therapy for his GERD and iron deficiency.  Taking the betaine HCl supplement as above.  They actually have educated themselves on the video capsule endoscopy exam.    Reassuring family history.  Mother suffered thyroid cancer  Sister: Non-Hodgkin's lymphoma  Grandmother: Kidney cancer    Never smoker      We reviewed recent labs.  Some labs are from the [employer] system which are not directly visible to me.  Wife opens and scrolls through some of his labs on the amanda on her mobile phone.    1/24/2020:  Normal CBC, hemoglobin 14.1g MCV 81.9    3/19/2022:  WBC 6500, hemoglobin 10.6g platelets 295,000    6/1/2022:  WBC 5.33, hemoglobin 10.4g, platelets 251,000    6/2/2023:  WBC 4400, hemoglobin 7.9g MCV 57.2, platelets 234,000    10/17/2023:  WBC 5.99, hemoglobin 14.0g MCV 80.4, platelets 175,000  Serum iron 85, 22% iron saturation, ferritin 89    6/16/2023:  Serum iron 26, 6% iron saturation, ferritin 2    8/17/2023:  WBC 4000, hemoglobin 12.6g MCV 73.7, platelets 235,000  Serum iron 33, 8% iron saturation, ferritin 14    2/23/2024 (post iron  infusions?):  WBC 5.66, hemoglobin 14.1g platelets 222,000  Serum iron 86, ferritin 20      Wt Readings from Last 20 Encounters:   24 210 lb 11.2 oz (95.6 kg)   23 195 lb (88.5 kg)   23 206 lb (93.4 kg)   22 204 lb (92.5 kg)   22 200 lb (90.7 kg)   22 200 lb (90.7 kg)   21 200 lb (90.7 kg)   20 207 lb (93.9 kg)   20 185 lb (83.9 kg)   20 190 lb (86.2 kg)   05/10/19 197 lb 12.8 oz (89.7 kg)   16 180 lb (81.6 kg)           [facility]    Review of Systems    =======================  EGD examination Dr. Héctor Fischer 2019 at [facility]:    Preoperative diagnosis: Gastroesophageal reflux disease    Findings:  \"In the distal esophagus, there multiple linear erosions indicative of reflux esophagitis with circumferential erythema, and friable erosions at the squamocolumnar junction noted at 37 cm, all consistent with LA grade D reflux esophagitis.\"  Mild nonspecific gastropathy in the stomach.    Plan was to increase PPI dosing to twice daily.    PATH:    Stomach; biopsy:   Gastric oxyntic type mucosa without significant histopathology  Diff-quik stain (with reactive positive control) is negative for Helicobacter pylori-like organisms    =======================  Esophagogastroduodenoscopy (EGD) Report           Oniel Cortezw      1975 Age 44 year old   PCP None Pcp Endoscopist Héctor Fischer MD      Date of procedure: 2020     Procedure: EGD w/ biopsy      Pre-operative diagnosis: GERD with esophagitis     Post-operative diagnosis: GERD with esophagitis, hiatal hernia, possible Justin's esophagus     Sedation: monitored anesthesia care (MAC)     Consent: We discussed the risks/benefits and alternatives to this procedure, as well as the planned sedation. Informed consent was obtained from the patient after the risks of the procedure were discussed, including but not limited to bleeding, perforation, aspiration, infection, or possibility of a missed  lesion as well as the risks of anesthesia including but not limited to cardiopulmonary complications. The patient signed informed consent and elected to proceed with EGD with intervention [i.e. Biopsy, control of bleeding, dilatation, polypectomy, endoscopic mucosal resection, etc.] as indicated.     EGD procedure: The patient was placed in the left lateral decubitus position and begun on continuous blood pressure pulse oximetry and EKG monitoring and this was maintained throughout the procedure. Once an adequate level of sedation was obtained a bite block was placed. Then the lubricated tip of the [id number]-[id number] diagnostic video upper endoscope was carefully inserted and advanced using direct visualization into the posterior pharynx and ultimately into the esophagus, stomach, and duodenum. Air was then withdrawn and the endoscope was removed. The patient tolerated the procedure well.     Estimated blood loss: insignificant     Specimens collected:  Esophageal biopsies     Complications: none     EGD findings:       1. Esophagus: The squamocolumnar junction was noted at 38 cm and appeared irregular with several short linear erosions consistent with LA grade B reflux esophagitis. There was a 0.5-1.0 cm x 1.0-2.0 cm tongue of salmon colored mucosa possibly representing Justin's esophagus. The segment was examined with high definition white light and narrow band imaging without any nodules or other mucosal abnormalities noted. Multiple cold forceps biopsies were obtained. The esophageal mucosa appeared unremarkable otherwise.  2. Stomach: There was a 2 cm sliding hiatal hernia. The stomach distended normally. Normal rugal folds were seen. The pylorus was patent. The gastric mucosa appeared unremarkable. Retroflexion revealed a normal fundus and cardia.   3. Duodenum: The duodenal mucosa appeared normal in the 1st and 2nd portion of the duodenum.      Impression:  1. GERD with esophagitis  2. Possible short segment  Justin's esophagus  3. Small sliding hiatal hernia  4. Otherwise, unremarkable upper endoscopy     Recommend:  1. Await pathology results  2. Continue your current medication including pantoprazole once daily   3. Follow up in GI clinic and with your primary care physician on a routine basis.    PATH:    Distal esophagus; biopsy:   Squamocolumnar junctional mucosa with focal incomplete intestinal metaplasia in a background of changes consistent with mild reflux esophagitis (see comment).  Diff-Quik stain negative for Helicobacter pylori organisms (appropriate control).  No dysplasia identified.     Comment:  Differential considerations for these findings include intestinal metaplasia of the gastric cardia and Justin's esophagus.  Clinical correlation is recommended.    =======================  Esophagogastroduodenoscopy (EGD) Report           Oniel MOREL 1975 Age 46 year old   PCP JAY VIRCHOW Endoscopist Héctor Fischer MD      Date of procedure: 2022     Procedure: EGD w/ biopsy      Pre-operative diagnosis: GERD, Justin's esophagus     Post-operative diagnosis: gastric polyps, hiatal hernia, esophagitis     Sedation:  monitored anesthesia care (MAC)     Consent: We discussed the risks/benefits and alternatives to this procedure, as well as the planned sedation. Informed consent was obtained from the patient after the risks of the procedure were discussed, including but not limited to bleeding, perforation, aspiration, infection, or possibility of a missed lesion as well as the risks of anesthesia including but not limited to cardiopulmonary complications. The patient signed informed consent and elected to proceed with EGD with intervention [i.e. Biopsy, control of bleeding, dilatation, polypectomy, endoscopic mucosal resection, etc.] as indicated.     EGD procedure: The patient was placed in the left lateral decubitus position and begun on continuous blood pressure pulse oximetry and EKG  monitoring and this was maintained throughout the procedure. Once an adequate level of sedation was obtained a bite block was placed. Then the lubricated tip of the [id number] diagnostic video upper endoscope was carefully inserted and advanced using direct visualization into the posterior pharynx and ultimately into the esophagus, stomach, and duodenum. Air was then withdrawn and the endoscope was removed. The patient tolerated the procedure well.     Estimated blood loss: insignificant     Specimens collected: gastric polyp biopsies     Complications: none     EGD findings:       1. Esophagus: The top of the gastric folds was noted at 40 cm. There was a 2 cm hiatal hernia. The squamocolumnar junction was noted at 40 cm and appeared irregular. There were 3 ulcerations in the distal esophagus which was continuous at the squamocolumnar junction of 50-75% consistent with LA grade C/D esophagitis. Evaluation for previously visualized and diagnosed Justin's was not possible in this setting and thus no biopsies taken due to need to improve esophagitis for evaluation of the underlying mucosa. The esophageal mucosa appeared unremarkable otherwise.  2. Stomach: The stomach distended normally. Normal rugal folds were seen. The pylorus was patent. There were multiple small, benign appearing, gastric polyps. Two cold forceps sample biopsies werew obtained. The gastric mucosa appeared unremarkable otherwise. Retroflexion revealed a normal fundus and cardia.   3. Duodenum: The duodenal mucosa appeared normal in the 1st and 2nd portion of the duodenum.      Impression:  1. LA grade C/D reflux esophagitis  2. Inability to evaluate known underlying Justin's esophagus due to #1  3. A small, 2 cm, sliding hiatal hernia  4. Multiple small benign appearing gastric polyps biopsied     Recommend:  1. Await pathology results  2. Change pantoprazole to omeprazole 40 mg BID  3. Repeat EGD - ideally in 2-3 months  4. Colonoscopy for  colorectal cancer screening    PATH:    Gastric polyp; biopsy:  Fragment of gastric fundic gland polyp showing areas of minimal/mild chronic gastritis.   No evidence of intestinal metaplasia, epithelial dysplasia, or malignancy identified.   No evidence of bacterial organisms consistent with Helicobacter species seen on Giemsa stain (with appropriate control reactivity).    =======================  Esophagogastroduodenoscopy (EGD) & Colonoscopy Report           Oniel MOREL 1975 Age 47 year old   PCP JAY VIRCHOW Endoscopist Héctor Fischer MD      Date of procedure: 2022     Procedure: EGD w/ biopsy & Colonoscopy w/ biopsy     Pre-operative diagnosis: GERD, colorectal cancer screening     Post-operative diagnosis: irregular Z line, gastric polyps, colon polyp, hemorrhoids     Sedation: monitored anesthesia care (MAC)     Consent: We discussed the risks/benefits and alternatives to this procedure, as well as the planned sedation. Informed consent was obtained from the patient after the risks of the procedure were discussed, including but not limited to bleeding, perforation, aspiration, infection, or possibility of a missed lesion as well as the risks of anesthesia including but not limited to cardiopulmonary complications. The patient signed informed consent and elected to proceed with EGD/Colonoscopy with intervention [i.e. Biopsy, control of bleeding, dilatation, polypectomy, endoscopic mucosal resection, etc.] as indicated.     EGD procedure: The patient was placed in the left lateral decubitus position and begun on continuous blood pressure pulse oximetry and EKG monitoring and this was maintained throughout the procedure. Once an adequate level of sedation was obtained a bite block was placed. Then the lubricated tip of the Mismlwp-[id number] diagnostic video upper endoscope was carefully inserted and advanced using direct visualization into the posterior pharynx and ultimately into the  esophagus. Air was then withdrawn and the endoscope was removed.     Colonoscopy procedure: Once an adequate level of sedation was obtained a digital rectal exam was completed, with normal tone and no masses palpated. Then the lubricated tip of the [id number] diagnostic video colonoscope was carefully inserted and advanced without difficulty to the cecum using the air insufflation technique (only Co2 was used for insufflation). The cecum was identified by localizing the trifold, the appendix and the ileocecal valve. Withdrawal was begun with thorough washing and careful examination of the colonic walls and folds. The ascending colon was viewed twice in the forward view. Photodocumentation was obtained. The bowel prep was good. Views of the colon were good with washing. Withdrawal time was 13 minutes.     Air was then withdrawn and the endoscope was removed. The patient tolerated the procedure well. There were no immediate postoperative complications. The patient’s vital signs were monitored throughout the procedure and remained stable.     Estimated blood loss: insignificant     Specimens collected:  Esophageal biopsies, terminal ileal biopsies, colon polyp     Complications: none     EGD findings:       1. Esophagus: The squamocolumnar junction was noted at 39 cm and appeared very mildly irregular. Multiple cold forceps biopsies were obtained. There was no esophagitis. The esophageal mucosa appeared unremarkable otherwise.  2. Stomach: The stomach distended normally. Normal rugal folds were seen. The pylorus was patent. There were several small benign appearing proximal gastric polyps which were previously biopsied. The gastric mucosa appeared unremarkable otherwise. Retroflexion revealed a normal fundus and cardia.   3. Duodenum: The duodenal mucosa appeared normal in the 1st and 2nd portion of the duodenum.      Colonoscopy findings:  Terminal ileum: there was a somewhat more than usual degree of lymphoid  hyperplasia appearance to the mucosa. Multiple cold forceps biopsies were obtained. Otherwise, normal mucosa     Cecum: normal mucosa and vascular pattern     Ascending colon: normal mucosa and vascular pattern     Transverse colon: normal mucosa and vascular pattern     Descending colon: normal mucosa and vascular pattern     Sigmoid colon: there was a 2-3 mm polyp removed by cold biopsy forceps. Otherwise, normal mucosa and vascular pattern     Rectum: retroflexed view showed small non-bleeding hemorrhoids. Otherwise, normal mucosa and vascular pattern.        Impression:  1. Healed LA grade C/D esophagitis on BID omeprazole  2. Mildly irregular Z line  3. Small benign appearing gastric polyps previously biopsied  4. A diminutive colon polyp removed  5. More than usual degree of appearance of lymphoid hyperplasia of the terminal ileum  6. Small non-bleeding hemorrhoids  7. Otherwise, unremarkable colonoscopy and upper endoscopy     Recommend:  1. Await pathology.   2. Repeat colonoscopy interval pending final pathology results. If new signs or symptoms develop, procedure may need to be repeated sooner.   3. Continue your current medications  4. Follow up with your primary care physician on a routine basis    PATH:    A. Distal esophagus; biopsy:  Squamocolumnar mucosa with acute and chronic inflammation.  Negative for intestinal metaplasia.     B. Terminal ileum; biopsy:  Small intestinal mucosa with reactive follicular lymphoid hyperplasia.  Preserved villous architecture.     C. Sigmoid colon polyp:   Hyperplastic polyp.  =======================    Esophagogastroduodenoscopy (EGD) & Colonoscopy Report           Oniel MOREL 1975 Age 48 year old   PCP JAY VIRCHOW Endoscopist Héctor Fischer MD      Date of procedure: 2023     Procedure: EGD w/ biopsy & Colonoscopy     Pre-operative diagnosis: iron deficiency anemia     Post-operative diagnosis: see impression     Sedation: monitored anesthesia  care (MAC)     Consent: We discussed the risks/benefits and alternatives to this procedure, as well as the planned sedation. Informed consent was obtained from the patient after the risks of the procedure were discussed, including but not limited to bleeding, perforation, aspiration, infection, or possibility of a missed lesion as well as the risks of anesthesia including but not limited to cardiopulmonary complications. The patient signed informed consent and elected to proceed with EGD/Colonoscopy with intervention [i.e. Biopsy, control of bleeding, dilatation, polypectomy, endoscopic mucosal resection, etc.] as indicated.     EGD procedure: The patient was placed in the left lateral decubitus position and begun on continuous blood pressure pulse oximetry and EKG monitoring and this was maintained throughout the procedure. Once an adequate level of sedation was obtained a bite block was placed. Then the lubricated tip of the [id number] diagnostic video upper endoscope was carefully inserted and advanced using direct visualization into the posterior pharynx and ultimately into the esophagus. Air was then withdrawn and the endoscope was removed.     Colonoscopy procedure: Once an adequate level of sedation was obtained a digital rectal exam was completed, with normal tone and no masses palpated. Then the lubricated tip of the Jjybuyy-[id number] diagnostic video colonoscope was carefully inserted and advanced without difficulty to the cecum using the air insufflation technique (only Co2 was used for insufflation). The cecum was identified by localizing the trifold, the appendix and the ileocecal valve. Withdrawal was begun with thorough washing and careful examination of the colonic walls and folds. The ascending colon was viewed twice in the forward view. Photodocumentation was obtained. The bowel prep was good. Views of the colon were good with washing. Withdrawal time was 15 minutes.     Air was then withdrawn  and the endoscope was removed. The patient tolerated the procedure well. There were no immediate postoperative complications. The patient’s vital signs were monitored throughout the procedure and remained stable.     Estimated blood loss: insignificant     Specimens collected:  esophageal, gastric, and duodenal biopsies     Complications: none     EGD findings:       1. Esophagus: The proximal and mid esophagus was unremarkable. There was linear erosions extending from the gastroesophageal junction with ulceration bridging circumferentially in the distal esophagus. Multiple cold forceps biopsies were obtained.   2. Stomach: The stomach distended normally. Normal rugal folds were seen. The pylorus was patent. There was a small polyp in the fundus. A cold forceps biopsy was obtained. The gastric mucosa appeared unremarkable otherwise. Multiple cold forceps biopsies were obtained randomly. Retroflexion revealed a normal fundus and cardia.   3. Duodenum: The duodenal mucosa appeared normal in the 1st and 2nd portion of the duodenum. Multiple cold forceps biopsies were obtained randomly.     Colonoscopy findings:  Terminal ileum: normal mucosa     Cecum: normal mucosa and vascular pattern     Ascending colon: normal mucosa and vascular pattern     Transverse colon: normal mucosa and vascular pattern     Descending colon: normal mucosa and vascular pattern     Sigmoid colon: normal mucosa and vascular pattern     Rectum: retroflexed view showed small to medium sized non-bleeding hemorrhoids. Otherwise, normal mucosa and vascular pattern.     Impression:  1. LA grade D (severe) reflux esophagitis  2. A small gastric polyp biopsied  3. Hemorrhoids  4. Otherwise, unremarkable upper endoscopy and colonoscopy     Recommend:  1. Await pathology.   2. Omeprazole 40 mg BID  3. Further discussion of GERD management   4. Continue iron replacement therapy and monitoring blood counts  5. Follow up with your primary care physician and  in the GI office      PATH:    CLINICAL INFORMATION  View Result View Result View Result View Result   Comment: Iron deficiency anemia   PATHOLOGIST        Comment: Deepa Lin M.D. PhD., Board Certified in Anatomic Pathology,  Specializing in Urologic Pathology and GI Pathology 2   (electronic signature)   A SOURCE        Comment: Duodenum, biopsy              A DIAGNOSIS        Comment: Small bowel mucosa with no diagnostic  histopathologic alterations including no evidence  of celiac disease.   B SOURCE        Comment: Stomach, biopsy              B DIAGNOSIS        Comment: Antral and fundic-type mucosa with mild reactive change.     No significant inflammation is present.     No Helicobacter pylori organisms are identified.      C SOURCE        Comment: Stomach, \"polyp\", biopsy              C DIAGNOSIS        Comment: Fundic gland polyp.   D SOURCE        Comment: Esophagus, biopsy              D DIAGNOSIS        Comment: Squamous mucosa with active inflammation and ulcer.     Cardiac-type columnar mucosa with colonic  information.     No evidence of goblet cell metaplasia.                  Current Outpatient Medications   Medication Sig Dispense Refill    Betaine HCl 300 MG Oral Tab Take 2,400 mg by mouth daily.      Omeprazole 40 MG Oral Capsule Delayed Release Take 1 capsule (40 mg total) by mouth 2 (two) times daily. 180 capsule 3    fluticasone propionate 50 MCG/ACT Nasal Suspension 1 spray by Nasal route 2 (two) times daily. 16 g 0    Omeprazole 40 MG Oral Capsule Delayed Release Take 1 capsule (40 mg total) by mouth 2 (two) times a day. Take 1 capsule by mouth daily before breakfast. 180 capsule 3    sucralfate 1 g Oral Tab Take 1 tablet (1 g total) by mouth 4 (four) times daily as needed. 120 tablet 3    FAMOTIDINE 40 MG Oral Tab TAKE 1 TABLET (40 MG TOTAL) BY MOUTH NIGHTLY AS NEEDED FOR HEARTBURN. 90 tablet 3    Levothyroxine Sodium 175 MCG Oral Tab Take 1 tablet (175 mcg total) by mouth  before breakfast.           Allergies:No Known Allergies  Imaging: No results found.       PHYSICAL EXAM:   Physical Exam                   Prior to this encounter, I spent over 10 minutes preparing for the visit, including reviewing documents from other specialties as well as from PCP and going over test results. During and after the face to face encounter, I spent an additional 65 minutes discussing the above and counseling this patient, reviewing chart notes and data with patient and composing this note.       Digital transcription software was utilized to produce this note. The note was proofread for content only. Typographical errors may remain.        Meds This Visit:  Requested Prescriptions      No prescriptions requested or ordered in this encounter       Imaging & Referrals:  None       ID#[id number]

## 2024-05-22 NOTE — PATIENT INSTRUCTIONS
Schedule Given VCE (capsule) exam at [facility]    BMI Readings from Last 1 Encounters:   05/22/24 30.23 kg/m²       MAC anesthesia    DX = Iron deficiency anemia    Mag Citrate prep    Medication instructions:    None    
Discharged

## 2024-05-22 NOTE — TELEPHONE ENCOUNTER
PPD PA-    Patient is scheduled within the next two weeks please check for PA.  Patient scheduled for Video Capsule Endoscopy [address]409 at [facility] on 5/29/24 at 07:45 Am   Diagnosis: Iron Deficiency Anemia D50.9  Patient has [employer]  ID#[id number]   Group # [id number]      Thanks!   Moises

## 2024-05-22 NOTE — TELEPHONE ENCOUNTER
Called patient and was able to speak to the Spouse Jyoti ,who was seen today in the Office ,verified , in regards to assist patient of scheduling his Video Capsule Endoscopy at [facility]. Notified Endo at first and spoke with Marilou Álvarez then Kelly SRINIVASANin Endo both approved to go ahead and schedule the patient . Patient's  spouse , Jyoti agreed with the discussion above and plan  for her spouse Oniel ,inform her that I will be sending this through My chart with detailed Instructions,she verbalizes understanding of this.    Scheduled for:  Video Capsule Endoscopy 11917  Provider Name:  Dr. De La Cruz  Date:  2024  Location:[facility]  Sedation:  NO SEDATION  Time:  06:45 Am (Patient is aware arrival time is at 0745 Am )  Prep:  Magnesium Citrate   Meds/Allergies Reconciled?:  Physician Reviewed   Diagnosis with codes:  Iron deficiency Anemia D50.9  Was patient informed to call insurance with codes (Y/N):  Yes  Referral sent?:  Referral was sent at the time of electronic surgical scheduling.  [facility] or [facility] notified?:  I sent an electronic request to Endo Scheduling and received a confirmation today.  Medication Orders:  Pt is aware to NOT take iron pills, herbal meds and diet supplements for 7 days before exam. Also to NOT take any form of alcohol, recreational drugs and any forms of ED meds 24 hours before exam.   Misc Orders:  Patient was informed about the new cancellation policy for his/her procedure. Patient was also given a copy of the cancellation policy at the time of the appointment and verbalized understanding.      Further instructions given by staff:  I provide prep instructions to patient at the time of the appointment and reviewed date, time and location, patient's spouse Jyoti  verbalized that she understood and is aware to call if she has any questions.

## 2024-05-23 ENCOUNTER — LAB ENCOUNTER (OUTPATIENT)
Dept: LAB | Facility: HOSPITAL | Age: 49
End: 2024-05-23
Attending: INTERNAL MEDICINE

## 2024-05-23 DIAGNOSIS — K21.00 GASTROESOPHAGEAL REFLUX DISEASE WITH ESOPHAGITIS WITHOUT HEMORRHAGE: ICD-10-CM

## 2024-05-23 DIAGNOSIS — D50.0 IRON DEFICIENCY ANEMIA DUE TO CHRONIC BLOOD LOSS: ICD-10-CM

## 2024-05-23 LAB
ALBUMIN SERPL-MCNC: 4.6 G/DL (ref 3.2–4.8)
ALBUMIN/GLOB SERPL: 2.1 {RATIO} (ref 1–2)
ALP LIVER SERPL-CCNC: 86 U/L
ALT SERPL-CCNC: 10 U/L
ANION GAP SERPL CALC-SCNC: 5 MMOL/L (ref 0–18)
AST SERPL-CCNC: 21 U/L (ref ?–34)
BILIRUB SERPL-MCNC: 0.6 MG/DL (ref 0.3–1.2)
BILIRUB UR QL: NEGATIVE
BUN BLD-MCNC: 12 MG/DL (ref 9–23)
BUN/CREAT SERPL: 11.9 (ref 10–20)
CALCIUM BLD-MCNC: 9.1 MG/DL (ref 8.7–10.4)
CHLORIDE SERPL-SCNC: 109 MMOL/L (ref 98–112)
CLARITY UR: CLEAR
CO2 SERPL-SCNC: 30 MMOL/L (ref 21–32)
CREAT BLD-MCNC: 1.01 MG/DL
DEPRECATED HBV CORE AB SER IA-ACNC: 9.8 NG/ML
DEPRECATED RDW RBC AUTO: 37.7 FL (ref 35.1–46.3)
EGFRCR SERPLBLD CKD-EPI 2021: 91 ML/MIN/1.73M2 (ref 60–?)
ERYTHROCYTE [DISTWIDTH] IN BLOOD BY AUTOMATED COUNT: 13.3 % (ref 11–15)
FASTING STATUS PATIENT QL REPORTED: YES
GLOBULIN PLAS-MCNC: 2.2 G/DL (ref 2–3.5)
GLUCOSE BLD-MCNC: 94 MG/DL (ref 70–99)
GLUCOSE UR-MCNC: NORMAL MG/DL
HCT VFR BLD AUTO: 41.9 %
HGB BLD-MCNC: 13.7 G/DL
HGB UR QL STRIP.AUTO: NEGATIVE
IRON SATN MFR SERPL: 12 %
IRON SERPL-MCNC: 50 UG/DL
KETONES UR-MCNC: NEGATIVE MG/DL
LEUKOCYTE ESTERASE UR QL STRIP.AUTO: NEGATIVE
MCH RBC QN AUTO: 25.7 PG (ref 26–34)
MCHC RBC AUTO-ENTMCNC: 32.7 G/DL (ref 31–37)
MCV RBC AUTO: 78.5 FL
NITRITE UR QL STRIP.AUTO: NEGATIVE
OSMOLALITY SERPL CALC.SUM OF ELEC: 298 MOSM/KG (ref 275–295)
PH UR: 6.5 [PH] (ref 5–8)
PLATELET # BLD AUTO: 196 10(3)UL (ref 150–450)
POTASSIUM SERPL-SCNC: 4.6 MMOL/L (ref 3.5–5.1)
PROT SERPL-MCNC: 6.8 G/DL (ref 5.7–8.2)
RBC # BLD AUTO: 5.34 X10(6)UL
SODIUM SERPL-SCNC: 144 MMOL/L (ref 136–145)
SP GR UR STRIP: 1.02 (ref 1–1.03)
TIBC SERPL-MCNC: 407 UG/DL (ref 250–425)
TRANSFERRIN SERPL-MCNC: 273 MG/DL (ref 215–365)
UROBILINOGEN UR STRIP-ACNC: NORMAL
WBC # BLD AUTO: 6.9 X10(3) UL (ref 4–11)

## 2024-05-23 PROCEDURE — 85027 COMPLETE CBC AUTOMATED: CPT | Performed by: INTERNAL MEDICINE

## 2024-05-23 PROCEDURE — 81001 URINALYSIS AUTO W/SCOPE: CPT

## 2024-05-23 PROCEDURE — 83540 ASSAY OF IRON: CPT | Performed by: INTERNAL MEDICINE

## 2024-05-23 PROCEDURE — 36415 COLL VENOUS BLD VENIPUNCTURE: CPT | Performed by: INTERNAL MEDICINE

## 2024-05-23 PROCEDURE — 84466 ASSAY OF TRANSFERRIN: CPT | Performed by: INTERNAL MEDICINE

## 2024-05-23 PROCEDURE — 82728 ASSAY OF FERRITIN: CPT | Performed by: INTERNAL MEDICINE

## 2024-05-23 PROCEDURE — 81015 MICROSCOPIC EXAM OF URINE: CPT

## 2024-05-23 PROCEDURE — 80053 COMPREHEN METABOLIC PANEL: CPT | Performed by: INTERNAL MEDICINE

## 2024-05-23 NOTE — TELEPHONE ENCOUNTER
Spoke to patient    Let him know that Dr. De La Cruz ordered him lab work and a   Pt verbalized understanding and stated he will come today to complete it.

## 2024-05-27 ENCOUNTER — APPOINTMENT (OUTPATIENT)
Dept: ULTRASOUND IMAGING | Facility: HOSPITAL | Age: 49
End: 2024-05-27
Attending: EMERGENCY MEDICINE

## 2024-05-27 ENCOUNTER — APPOINTMENT (OUTPATIENT)
Dept: CT IMAGING | Facility: HOSPITAL | Age: 49
End: 2024-05-27
Attending: EMERGENCY MEDICINE

## 2024-05-27 ENCOUNTER — HOSPITAL ENCOUNTER (EMERGENCY)
Facility: HOSPITAL | Age: 49
Discharge: HOME | End: 2024-05-27
Attending: EMERGENCY MEDICINE

## 2024-05-27 VITALS
BODY MASS INDEX: 29 KG/M2 | HEART RATE: 65 BPM | RESPIRATION RATE: 16 BRPM | TEMPERATURE: 97 F | SYSTOLIC BLOOD PRESSURE: 113 MMHG | OXYGEN SATURATION: 97 % | DIASTOLIC BLOOD PRESSURE: 74 MMHG | WEIGHT: 205 LBS

## 2024-05-27 DIAGNOSIS — R20.2 PARESTHESIAS: Primary | ICD-10-CM

## 2024-05-27 PROCEDURE — 99284 EMERGENCY DEPT VISIT MOD MDM: CPT

## 2024-05-27 PROCEDURE — 72125 CT NECK SPINE W/O DYE: CPT | Performed by: EMERGENCY MEDICINE

## 2024-05-27 PROCEDURE — 93971 EXTREMITY STUDY: CPT | Performed by: EMERGENCY MEDICINE

## 2024-05-27 RX ORDER — METHYLPREDNISOLONE 4 MG/1
TABLET ORAL
Qty: 1 EACH | Refills: 0 | Status: SHIPPED | OUTPATIENT
Start: 2024-05-27

## 2024-05-27 NOTE — ED INITIAL ASSESSMENT (HPI)
Patient present with right arm \"pain and tingling\" since having a blood draw in the antecubital region last Thursday.

## 2024-05-27 NOTE — DISCHARGE INSTRUCTIONS
Please follow-up with Dr Latham of the neurosurgeon as well as the neurologist.  At this time it seems like the symptoms may be coming from a nerve in your neck.  Return for changes worsening or new problems.  Take the Medrol Dosepak and ibuprofen.  Read all instructions for further recommendations.

## 2024-05-27 NOTE — ED PROVIDER NOTES
Patient Seen in: [facility] Emergency Department      History     Chief Complaint   Patient presents with    Pain     Stated Complaint: right arm pain    Subjective:   48-year-old male with history of cervical spine surgery in the past 20 years ago as well as hypothyroidism and GERD here stating that a few hours after getting blood draw in his right arm on Thursday which was 4 days ago he started getting pain and tingling in his right forearm into his fingertips.  It sort of a mild throbbing ache in all his fingertips are tingling as well as his forearm.  He has a little throb in his tricep as well.  He has some bruising in his antecubital fossa.  No weakness.  He says if he puts his arm above his head the symptoms improve a little.  He did not have any neck pain per se but he said his scapula was hurting the other day.  No chest pain or shortness of breath or palpitations.  No fevers.  No vomiting or diarrhea.            Objective:   No pertinent past medical history.            No pertinent past surgical history.              No pertinent social history.            Review of Systems    Positive for stated complaint: right arm pain  Other systems are as noted in HPI.  Constitutional and vital signs reviewed.      All other systems reviewed and negative except as noted above.    Physical Exam     ED Triage Vitals [05/27/24 1249]   /71   Pulse 72   Resp 16   Temp 97.1 °F (36.2 °C)   Temp src Temporal   SpO2 97 %   O2 Device None (Room air)       Current Vitals:   Vital Signs  BP: 113/74  Pulse: 65  Resp: 16  Temp: 97.1 °F (36.2 °C)  Temp src: Temporal    Oxygen Therapy  SpO2: 97 %  O2 Device: None (Room air)            Physical Exam  HENT:      Head: Normocephalic and atraumatic.      Right Ear: External ear normal.      Left Ear: External ear normal.      Nose: Nose normal.      Mouth/Throat:      Mouth: Mucous membranes are moist.   Eyes:      Extraocular Movements: Extraocular movements intact.       Pupils: Pupils are equal, round, and reactive to light.   Neck:      Comments: No tenderness in his neck.  Cardiovascular:      Rate and Rhythm: Normal rate and regular rhythm.      Pulses: Normal pulses.   Pulmonary:      Effort: Pulmonary effort is normal.      Breath sounds: Normal breath sounds.   Abdominal:      Palpations: Abdomen is soft.      Tenderness: There is no abdominal tenderness.   Musculoskeletal:         General: Normal range of motion.      Cervical back: Normal range of motion and neck supple.      Comments: Right forearm does appear swollen compared to the left.  It is soft and nontender.  No suspicion of compartment syndrome.  Able to flex and extend at the wrist without pain.  Distally neurovascular intact.  He has good sensation throughout and a 2 out of 4 biceps reflex.  Normal motor throughout.  Good cap refill and a strong radial pulse.  There is bruising in the antecubital region but no cords are palpated.   Lymphadenopathy:      Cervical: No cervical adenopathy.   Skin:     General: Skin is warm.      Capillary Refill: Capillary refill takes less than 2 seconds.   Neurological:      General: No focal deficit present.      Mental Status: He is alert.      Sensory: No sensory deficit.      Motor: No weakness.               ED Course   Labs Reviewed - No data to display       ED Course as of 05/27/24 1848  ------------------------------------------------------------  Time: 05/27 1650  Comment: Doppler and CT cervical spine showed no acute findings.  Patient is telling me that not only the arm movement but neck movement changes or symptoms which certainly tells me this is probably more peripheral nerve involvement probably something with cervical radiculopathy.  Will give neurosurgery and neurology follow-up as well as a Medrol Dosepak.  Understands come back for changes worsening  ------------------------------------------------------------  Time: 05/27 1654  Comment: Patient is eating food  with his right hand when I came up to him to give him his results.  He is also using his right arm in conversation with multiple gestures.  Looks comfortable.  He still intact.  He is telling me now the neck movements change his symptoms this does seem to be cervical radiculopathy.  They think maybe his right forearm is at baseline bigger than his left forearm  ------------------------------------------------------------  Time: 05/27 1848  Comment: I did communicate with the neurosurgeon Dr. Latham to help establish follow-up              MDM      US VENOUS DOPPLER ARM RIGHT - DIAG IMG (CPT=93971)    Result Date: 5/27/2024  CONCLUSION:  1. No right upper extremity DVT.    Dictated by (CST): Gonzalo Landry MD on 5/27/2024 at 4:12 PM     Finalized by (CST): Gonzalo Landry MD on 5/27/2024 at 4:13 PM          CT SPINE CERVICAL (CPT=72125)    Result Date: 5/27/2024  CONCLUSION:  1. No acute fracture or subluxation. 2. Multilevel degenerative disc disease. 3. C5-6:  ACDF.  No significant stenosis.  significant stenosis. 4. Thyromegaly.  Correlate with thyroid function tests.    Dictated by (CST): Gonzalo Landry MD on 5/27/2024 at 3:15 PM     Finalized by (CST): Gonzalo Landry MD on 5/27/2024 at 3:22 PM                                            Medical Decision Making  Patient with right arm tingling and pain after a blood draw 4 days ago.  There is bruising there and the forearm is swollen so it does seem to be a local problem possibly a nerve was injured during blood draw because they said they had a hard time finding a vein.  This could explain some of the swelling.  Will rule out DVT.  Also has a history of cervical spine problems and has had a discectomy in fusion so certainly this could be radicular pain as well.  Moving the arm up above his head makes it go away so it does seem to be something peripheral and not a stroke.  I found no deficit on exam.  Will do Doppler and CT C-spine and have him follow-up with  neurosurgery quickly.    Amount and/or Complexity of Data Reviewed  External Data Reviewed: notes.  Radiology: ordered and independent interpretation performed. Decision-making details documented in ED Course.  Discussion of management or test interpretation with external provider(s): Please see ED course.    Risk  Prescription drug management.        Disposition and Plan     Clinical Impression:  1. Paresthesias         Disposition:  Discharge  5/27/2024  4:54 pm    Follow-up:  Virgilio Latham MD  [address]  [place] [address]  [phone]    Follow up in 1 day(s)      Dalia Segura DO  [address]  [place] [address]  [phone]    Follow up            Medications Prescribed:  Discharge Medication List as of 5/27/2024  4:57 PM        START taking these medications    Details   methylPREDNISolone (MEDROL) 4 MG Oral Tablet Therapy Pack Dosepack: take as directed, Normal, Disp-1 each, R-0

## 2024-05-28 RX ORDER — SODIUM CHLORIDE, SODIUM LACTATE, POTASSIUM CHLORIDE, CALCIUM CHLORIDE 600; 310; 30; 20 MG/100ML; MG/100ML; MG/100ML; MG/100ML
INJECTION, SOLUTION INTRAVENOUS CONTINUOUS
OUTPATIENT
Start: 2024-05-28

## 2024-05-28 RX ORDER — FERROUS SULFATE 324(65)MG
1 TABLET, DELAYED RELEASE (ENTERIC COATED) ORAL DAILY
COMMUNITY

## 2024-05-29 ENCOUNTER — HOSPITAL ENCOUNTER (OUTPATIENT)
Facility: HOSPITAL | Age: 49
Setting detail: HOSPITAL OUTPATIENT SURGERY
Discharge: HOME | End: 2024-05-29
Attending: INTERNAL MEDICINE | Admitting: INTERNAL MEDICINE

## 2024-05-29 PROCEDURE — 91110 GI TRC IMG INTRAL ESOPH-ILE: CPT | Performed by: INTERNAL MEDICINE

## 2024-05-29 PROCEDURE — 0DJ08ZZ INSPECTION OF UPPER INTESTINAL TRACT, VIA NATURAL OR ARTIFICIAL OPENING ENDOSCOPIC: ICD-10-PCS | Performed by: INTERNAL MEDICINE

## 2024-05-29 RX ORDER — TADALAFIL 5 MG/1
5 TABLET ORAL DAILY
COMMUNITY
Start: 2024-05-09

## 2024-05-30 ENCOUNTER — TELEPHONE (OUTPATIENT)
Facility: CLINIC | Age: 49
End: 2024-05-30

## 2024-05-30 VITALS
SYSTOLIC BLOOD PRESSURE: 116 MMHG | RESPIRATION RATE: 16 BRPM | DIASTOLIC BLOOD PRESSURE: 76 MMHG | HEIGHT: 70 IN | OXYGEN SATURATION: 95 % | BODY MASS INDEX: 29.35 KG/M2 | HEART RATE: 66 BPM | WEIGHT: 205 LBS

## 2024-05-30 NOTE — OPERATIVE REPORT
\"GIVEN\" Small Bowel Capsule Endoscopy report    Pre-op Diagnosis: Iron deficiency anemia    Postoperative diagnosis: See impression    Study date: 5/29/2024    Sedation: none    Study details:    Video capsule enteroscopy examination is performed today for evaluation of severe iron deficiency anemia first diagnosed June 2023.  This has been treated with intravenous iron infusions elsewhere.    As per recent office note, 3 EGD examinations and 2 EGD/colonoscopy examinations completed 6/5/2019, 2/11/2020, 1/25/2022, 7/19/2022, 9/20/2023.     Repeat exams as above have shown reflux esophagitis with friable linear erosions to zak esophageal ulcers distal esophagus; small gastric glandular polyp; repeatedly normal colonoscopy examination with ileoscopy.  Most recent exam 9/20/2023 was an EGD/colonoscopy exam.     No H. pylori on gastric biopsies/polyps.     Normal biopsies of duodenum/terminal ileum.      The patient was given a bowel prep the night before this procedure with a diet of clear liquids and one bottle of magnesium citrate.  On the morning of the procedure, a dose of simethicone medication was administered and the patient was fitted with the recorder vest.  The Given capsule was activated and the patient swallowed it uneventfully.    Study data:    Capsule reached the stomach at 00:00:30  Capsule entered the duodenum at 00:57:56 and reached the cecum at 04:39:01  Small bowel passage time 3 hrs 41 mins    Study findings:  Overall good quality small bowel study with clear bilious chyme entire length of the examined small bowel, good visualization of small bowel mucosa.  1 photo of the GE junction shows ongoing reflux esophagitis and slow active bleeding right at the Z-line.  No blood in the stomach.  Limited views of the stomach show clear secretions present, no blood no ulcer.  Rare red dots/villi seen throughout the small bowel but no small bowel ulcer, AVM lesion, fresh or old blood, or active bleeding.   Clear chyme throughout.  Exuberant circumferential lymphoid nodules/lymphoid hyperplasia terminal ileum just before capsule enters the cecum; light green stool in cecum    Recommendations:  [facility] with [facility] infusions  Continue aggressive therapy for reflux esophagitis with previously described esophageal ulcers, slow active bleeding on today's study.  Continue omeprazole 40 mg once daily indefinitely, step up to twice daily if anemia recurs

## 2024-05-30 NOTE — TELEPHONE ENCOUNTER
GI RNs,    Please call Oniel to advise that Wednesday's video capsule enteroscopy study (VCE) went very well.  This study showed healthy small intestine, no ulcers certainly no tumor in the small instestine, nothing bleeding in the small intestine.    The capsule did actually catch and take photos of reflux esophagitis with a small ulcer at the GE junction and slow active bleeding from his lower esophagus as the capsule slid down.  This was pretty much the only blood or bleeding seen on the entire study.    Though he and his wife have been very frustrated, bottom line is he is bleeding chronically on and off from his lower esophagus and this reflux esophagitis.  He should do very well taking the omeprazole medication once or twice daily indefinitely.    Please recommend continuing on the omeprazole 40 mg at least once daily indefinitely.  If anemia returns on once daily omeprazole, may need to step up to twice daily dosing.    - cb

## 2024-05-31 NOTE — TELEPHONE ENCOUNTER
I spoke to the pt and reviewed Dr De La Cruz's recommendations below regarding the video capsule results. Pt voices understanding and all of his questions were answered    I sent him a [employer] message asking if he needed a new omeprazole prescription as I did not mention it during our phone conversation

## 2024-06-10 ENCOUNTER — OFFICE VISIT (OUTPATIENT)
Dept: SURGERY | Facility: CLINIC | Age: 49
End: 2024-06-10
Payer: COMMERCIAL

## 2024-06-10 VITALS
HEART RATE: 76 BPM | BODY MASS INDEX: 29.09 KG/M2 | SYSTOLIC BLOOD PRESSURE: 118 MMHG | WEIGHT: 203.19 LBS | HEIGHT: 70 IN | DIASTOLIC BLOOD PRESSURE: 81 MMHG

## 2024-06-10 DIAGNOSIS — R20.2 NUMBNESS AND TINGLING OF RIGHT ARM: ICD-10-CM

## 2024-06-10 DIAGNOSIS — R20.0 NUMBNESS AND TINGLING OF RIGHT ARM: ICD-10-CM

## 2024-06-10 DIAGNOSIS — Z98.1 HISTORY OF FUSION OF CERVICAL SPINE: Primary | ICD-10-CM

## 2024-06-10 DIAGNOSIS — M47.22 OSTEOARTHRITIS OF SPINE WITH RADICULOPATHY, CERVICAL REGION: ICD-10-CM

## 2024-06-10 DIAGNOSIS — R29.898 DECREASED GRIP STRENGTH OF RIGHT HAND: ICD-10-CM

## 2024-06-10 DIAGNOSIS — M54.12 CERVICAL RADICULOPATHY: ICD-10-CM

## 2024-06-10 PROCEDURE — 99203 OFFICE O/P NEW LOW 30 MIN: CPT | Performed by: STUDENT IN AN ORGANIZED HEALTH CARE EDUCATION/TRAINING PROGRAM
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
## 2024-06-25 ENCOUNTER — TELEPHONE (OUTPATIENT)
Facility: CLINIC | Age: 49
End: 2024-06-25

## 2024-06-25 NOTE — TELEPHONE ENCOUNTER
Patient's spouse calling states is having rectal bleeding time to time after procedure. Please call.

## 2024-06-25 NOTE — TELEPHONE ENCOUNTER
Called and spoke to spouse and patient.     States that he has been having rectal bleeding when he is not had a recent bowel movement.     Pt states that the blood has seeped through his clothes.    States it has happened at least twice.     Pt states that intermittently he will have blood when he wipes after a bowel movement.     Pt states last night it was enough blood that did turn the toilet water pink.     Denies straining or constipation.     States he more loose stools.     Pt is taking omeprazole 1-2 times a day.     States that he does have a burning sensation in his chest intermittently even when taking the medication twice a day..      Pt has been taking his iron supplement daily.        Advised pt to go to ER if symptoms worsen. Pt agreed.

## 2024-06-25 NOTE — TELEPHONE ENCOUNTER
Thanks all.    Please call Oniel to advise him to continue on the omeprazole twice daily.  That is the strongest remedy for heartburn and would prevent any ulcers.  By his previous EGD exams, we know that he gets severe esophagitis with esophageal ulcers and bleeding.    Blood with his stools, on the toilet paper is from hemorrhoids.  The only simple treatment for that is to start taking unsweetened Metamucil or Konsyl fiber supplement, 1-2 teaspoons daily in a glass of water or juice.  That usually helps.    Rarely, if a good bowel regimen is not enough he will need to see a General Surgeon about the hemorrhoids.    - cb

## 2024-06-27 ENCOUNTER — TELEPHONE (OUTPATIENT)
Facility: CLINIC | Age: 49
End: 2024-06-27

## 2024-06-27 RX ORDER — OMEPRAZOLE 40 MG/1
40 CAPSULE, DELAYED RELEASE ORAL 2 TIMES DAILY
Qty: 180 CAPSULE | Refills: 3 | Status: SHIPPED | OUTPATIENT
Start: 2024-06-27 | End: 2024-07-01

## 2024-06-27 NOTE — TELEPHONE ENCOUNTER
Last OV note 5/24/24     Last procedure 5/29/24    Per 6/25 Shawn Velazquez MD     6/25/24  6:48 PM  Note     Thanks all.     Please call Oniel to advise him to continue on the omeprazole twice daily.  That is the strongest remedy for heartburn and would prevent any ulcers.  By his previous EGD exams, we know that he gets severe esophagitis with esophageal ulcers and bleeding.        Rx sent to pharmacy

## 2024-06-27 NOTE — TELEPHONE ENCOUNTER
Patient's wife called for refill.  Please call into [facility].        Current Outpatient Medications:       Omeprazole 40 MG Oral Capsule Delayed Release, Take 1 capsule (40 mg total) by mouth 2 (two) times daily., Disp: 180 capsule, Rfl: 3

## 2024-07-01 ENCOUNTER — TELEPHONE (OUTPATIENT)
Facility: CLINIC | Age: 49
End: 2024-07-01

## 2024-07-01 RX ORDER — OMEPRAZOLE 40 MG/1
40 CAPSULE, DELAYED RELEASE ORAL 2 TIMES DAILY
Qty: 180 CAPSULE | Refills: 3 | Status: SHIPPED | OUTPATIENT
Start: 2024-07-01

## 2024-07-01 NOTE — TELEPHONE ENCOUNTER
Patient states that prescription for omeprazole should have gone to [facility] in [place].  Please call.  Patient is out of medication.    See 6/27/24 closed telephone encounter.

## 2024-07-14 ENCOUNTER — HOSPITAL ENCOUNTER (OUTPATIENT)
Age: 49
Discharge: HOME | End: 2024-07-14
Payer: COMMERCIAL

## 2024-07-14 VITALS
WEIGHT: 205 LBS | OXYGEN SATURATION: 100 % | DIASTOLIC BLOOD PRESSURE: 87 MMHG | TEMPERATURE: 97 F | HEART RATE: 90 BPM | HEIGHT: 70 IN | RESPIRATION RATE: 18 BRPM | SYSTOLIC BLOOD PRESSURE: 124 MMHG | BODY MASS INDEX: 29.35 KG/M2

## 2024-07-14 DIAGNOSIS — T63.441A BEE STING, ACCIDENTAL OR UNINTENTIONAL, INITIAL ENCOUNTER: Primary | ICD-10-CM

## 2024-07-14 PROCEDURE — 99214 OFFICE O/P EST MOD 30 MIN: CPT

## 2024-07-14 PROCEDURE — 99213 OFFICE O/P EST LOW 20 MIN: CPT

## 2024-07-14 RX ORDER — TRIAMCINOLONE ACETONIDE 1 MG/G
CREAM TOPICAL 2 TIMES DAILY
Qty: 28.4 G | Refills: 0 | Status: SHIPPED | OUTPATIENT
Start: 2024-07-14

## 2024-07-14 RX ORDER — CEPHALEXIN 500 MG/1
500 CAPSULE ORAL 4 TIMES DAILY
Qty: 28 CAPSULE | Refills: 0 | Status: SHIPPED | OUTPATIENT
Start: 2024-07-14 | End: 2024-07-21

## 2024-07-14 NOTE — ED PROVIDER NOTES
Patient Seen in: [facility]      History     Chief Complaint   Patient presents with    Bite Sting,Insect            Stated Complaint: Bee Sting    Subjective:   HPI    48 yo male presenting with c/o insect bite/sting. States he was stung 2 days ago while mowing the grass. Wife notes he has been itching the area and now there is increased redness and weeping to the area.  Denies fevers. Applied neosporin yesterday    Objective:   Past Medical History:    Anemia    Justin esophagus    Blood disorder    Disorder of thyroid    Esophageal reflux    H/O gastroesophageal reflux (GERD)    Hemorrhoids    Hyperlipidemia    Thyroid disease    Vitiligo              Past Surgical History:   Procedure Laterality Date    Colonoscopy      Colonoscopy N/A 07/19/2022    Procedure: COLONOSCOPY/ESOPHAGOGASTRODUODENOSCOPY;  Surgeon: Héctor Fischer MD;  [facility]    Colonoscopy      Colonoscopy  09/20/2023    Egd  06/05/2019    Egd  02/11/2020    Egd  01/25/2022    Egd  07/19/2022    Egd  09/20/2023    Other surgical history      Neck discectomy C5-C6    Upper gi endoscopy,exam                  Social History     Socioeconomic History    Marital status:    Tobacco Use    Smoking status: Never    Smokeless tobacco: Never   Vaping Use    Vaping status: Never Used   Substance and Sexual Activity    Alcohol use: Never    Drug use: Never              Review of Systems    Positive for stated Chief Complaint: Bite Sting,Insect (/)    Other systems are as noted in HPI.  Constitutional and vital signs reviewed.      All other systems reviewed and negative except as noted above.    Physical Exam     ED Triage Vitals [07/14/24 0920]   /87   Pulse 90   Resp 18   Temp 97.1 °F (36.2 °C)   Temp src Temporal   SpO2 100 %   O2 Device None (Room air)       Current Vitals:   Vital Signs  BP: 124/87  Pulse: 90  Resp: 18  Temp: 97.1 °F (36.2 °C)  Temp src: Temporal    Oxygen Therapy  SpO2: 100 %  O2 Device: None  (Room air)            Physical Exam  Vitals and nursing note reviewed.   Constitutional:       General: He is not in acute distress.  HENT:      Head: Normocephalic and atraumatic.      Right Ear: External ear normal.      Left Ear: External ear normal.      Nose: Nose normal.      Mouth/Throat:      Mouth: Mucous membranes are moist.   Eyes:      Extraocular Movements: Extraocular movements intact.      Pupils: Pupils are equal, round, and reactive to light.   Cardiovascular:      Rate and Rhythm: Normal rate.   Pulmonary:      Effort: Pulmonary effort is normal.   Abdominal:      General: Abdomen is flat.   Musculoskeletal:         General: Normal range of motion.      Cervical back: Normal range of motion.   Skin:     General: Skin is warm.             Comments: There is edema to the right lower extremity at the level of ankle and foot.  Medially there is a lichenified area which is weeping.  This area is surrounded by erythema   Neurological:      General: No focal deficit present.      Mental Status: He is alert and oriented to person, place, and time.   Psychiatric:         Mood and Affect: Mood normal.         Behavior: Behavior normal.               ED Course   Labs Reviewed - No data to display     49-year-old male presenting from home for evaluation of rash, itching to the right lower extremity after he was stung by a bee 2 days ago  Ddx-inflammatory reaction, secondary infection, cellulitis, contact dermatitis    Advised to discontinue Neosporin and occlusive bandage.  Recommended topical Kenalog for itching/irritation and will discharge with p.o. Keflex.  Continued monitoring and return precautions reviewed               MDM                                         Medical Decision Making      Disposition and Plan     Clinical Impression:  1. Bee sting, accidental or unintentional, initial encounter         Disposition:  Discharge  7/14/2024  9:38 am    Follow-up:  Virchow, Jay [address]  [address]  [place] [address] [phone]                Medications Prescribed:  Discharge Medication List as of 7/14/2024  9:44 AM        START taking these medications    Details   cephalexin 500 MG Oral Cap Take 1 capsule (500 mg total) by mouth 4 (four) times daily for 7 days., Normal, Disp-28 capsule, R-0      triamcinolone 0.1 % External Cream Apply topically 2 (two) times daily., Normal, Disp-28.4 g, R-0

## 2024-07-14 NOTE — DISCHARGE INSTRUCTIONS
You should apply Kenalog around the area to help with itching    Take Benadryl 25 to 50 mg for itching    Take antibiotics as prescribed    If you develop fever, increasing pain you should be re-evaluated

## 2024-07-14 NOTE — ED INITIAL ASSESSMENT (HPI)
Pt presents to the IC with c/o multiple mosquito bite to the right lower leg and a bee sting to the medial aspect of the distal right lower leg on Friday. Site is red, warm and swollen. Pt has been itching and scratching. +drainage. No fevers.

## 2024-07-24 ENCOUNTER — TELEPHONE (OUTPATIENT)
Dept: SURGERY | Facility: CLINIC | Age: 49
End: 2024-07-24

## 2024-07-24 NOTE — TELEPHONE ENCOUNTER
Overdue results reminder rcvd for pt MRI.    Pt is [facility] [employer] active and has proxy at this time.    Attempt #1: [employer] msg sent to pt to inform.    Postponing for 2 weeks for subsequent attempts.

## 2024-11-02 PROCEDURE — 99284 EMERGENCY DEPT VISIT MOD MDM: CPT

## 2024-11-02 PROCEDURE — 36415 COLL VENOUS BLD VENIPUNCTURE: CPT

## 2024-11-02 PROCEDURE — 93010 ELECTROCARDIOGRAM REPORT: CPT

## 2024-11-03 ENCOUNTER — HOSPITAL ENCOUNTER (EMERGENCY)
Facility: HOSPITAL | Age: 49
Discharge: HOME | End: 2024-11-03
Attending: EMERGENCY MEDICINE
Payer: COMMERCIAL

## 2024-11-03 VITALS
RESPIRATION RATE: 18 BRPM | SYSTOLIC BLOOD PRESSURE: 110 MMHG | HEIGHT: 71 IN | OXYGEN SATURATION: 97 % | TEMPERATURE: 98 F | HEART RATE: 67 BPM | BODY MASS INDEX: 28.7 KG/M2 | DIASTOLIC BLOOD PRESSURE: 65 MMHG | WEIGHT: 205 LBS

## 2024-11-03 DIAGNOSIS — R06.09 DYSPNEA ON EXERTION: ICD-10-CM

## 2024-11-03 DIAGNOSIS — K60.2 ANAL FISSURE: ICD-10-CM

## 2024-11-03 DIAGNOSIS — K62.5 RECTAL BLEEDING: Primary | ICD-10-CM

## 2024-11-03 LAB
ANION GAP SERPL CALC-SCNC: 8 MMOL/L (ref 0–18)
ATRIAL RATE: 64 BPM
BASOPHILS # BLD AUTO: 0.07 X10(3) UL (ref 0–0.2)
BASOPHILS NFR BLD AUTO: 0.8 %
BUN BLD-MCNC: 18 MG/DL (ref 9–23)
BUN/CREAT SERPL: 16.1 (ref 10–20)
CALCIUM BLD-MCNC: 9.7 MG/DL (ref 8.7–10.4)
CHLORIDE SERPL-SCNC: 108 MMOL/L (ref 98–112)
CO2 SERPL-SCNC: 25 MMOL/L (ref 21–32)
CREAT BLD-MCNC: 1.12 MG/DL
DEPRECATED RDW RBC AUTO: 40.5 FL (ref 35.1–46.3)
EGFRCR SERPLBLD CKD-EPI 2021: 80 ML/MIN/1.73M2 (ref 60–?)
EOSINOPHIL # BLD AUTO: 0.1 X10(3) UL (ref 0–0.7)
EOSINOPHIL NFR BLD AUTO: 1.2 %
ERYTHROCYTE [DISTWIDTH] IN BLOOD BY AUTOMATED COUNT: 15.3 % (ref 11–15)
GLUCOSE BLD-MCNC: 133 MG/DL (ref 70–99)
HCT VFR BLD AUTO: 37.7 %
HGB BLD-MCNC: 12 G/DL
IMM GRANULOCYTES # BLD AUTO: 0.02 X10(3) UL (ref 0–1)
IMM GRANULOCYTES NFR BLD: 0.2 %
LYMPHOCYTES # BLD AUTO: 1.72 X10(3) UL (ref 1–4)
LYMPHOCYTES NFR BLD AUTO: 20.9 %
MCH RBC QN AUTO: 23.5 PG (ref 26–34)
MCHC RBC AUTO-ENTMCNC: 31.8 G/DL (ref 31–37)
MCV RBC AUTO: 73.8 FL
MONOCYTES # BLD AUTO: 0.72 X10(3) UL (ref 0.1–1)
MONOCYTES NFR BLD AUTO: 8.7 %
NEUTROPHILS # BLD AUTO: 5.61 X10 (3) UL (ref 1.5–7.7)
NEUTROPHILS # BLD AUTO: 5.61 X10(3) UL (ref 1.5–7.7)
NEUTROPHILS NFR BLD AUTO: 68.2 %
OSMOLALITY SERPL CALC.SUM OF ELEC: 296 MOSM/KG (ref 275–295)
P AXIS: 55 DEGREES
P-R INTERVAL: 188 MS
PLATELET # BLD AUTO: 232 10(3)UL (ref 150–450)
POTASSIUM SERPL-SCNC: 3.7 MMOL/L (ref 3.5–5.1)
Q-T INTERVAL: 434 MS
QRS DURATION: 108 MS
QTC CALCULATION (BEZET): 447 MS
R AXIS: 61 DEGREES
RBC # BLD AUTO: 5.11 X10(6)UL
SODIUM SERPL-SCNC: 141 MMOL/L (ref 136–145)
T AXIS: 48 DEGREES
TROPONIN I SERPL HS-MCNC: 29 NG/L
VENTRICULAR RATE: 64 BPM
WBC # BLD AUTO: 8.2 X10(3) UL (ref 4–11)

## 2024-11-03 PROCEDURE — 80048 BASIC METABOLIC PNL TOTAL CA: CPT | Performed by: EMERGENCY MEDICINE

## 2024-11-03 PROCEDURE — 85025 COMPLETE CBC W/AUTO DIFF WBC: CPT | Performed by: EMERGENCY MEDICINE

## 2024-11-03 PROCEDURE — 93005 ELECTROCARDIOGRAM TRACING: CPT

## 2024-11-03 PROCEDURE — 84484 ASSAY OF TROPONIN QUANT: CPT | Performed by: EMERGENCY MEDICINE

## 2024-11-03 RX ORDER — HYDROCORTISONE 25 MG/G
1 CREAM TOPICAL 2 TIMES DAILY
Qty: 28 G | Refills: 0 | Status: SHIPPED | OUTPATIENT
Start: 2024-11-03 | End: 2024-11-03 | Stop reason: RX

## 2024-11-03 RX ORDER — HYDROCORTISONE 25 MG/G
1 CREAM TOPICAL 2 TIMES DAILY
Qty: 28 G | Refills: 0 | Status: SHIPPED | OUTPATIENT
Start: 2024-11-03 | End: 2024-11-17

## 2024-11-03 RX ORDER — DOCUSATE SODIUM 100 MG/1
100 CAPSULE, LIQUID FILLED ORAL 2 TIMES DAILY
Qty: 60 CAPSULE | Refills: 0 | Status: SHIPPED | OUTPATIENT
Start: 2024-11-03 | End: 2024-12-03

## 2024-11-03 RX ORDER — HYDROCORTISONE ACETATE 25 MG/1
25 SUPPOSITORY RECTAL 2 TIMES DAILY PRN
Qty: 24 SUPPOSITORY | Refills: 0 | Status: SHIPPED | OUTPATIENT
Start: 2024-11-03 | End: 2024-11-15

## 2024-11-03 RX ORDER — HYDROCORTISONE ACETATE 25 MG/1
25 SUPPOSITORY RECTAL 2 TIMES DAILY PRN
Qty: 24 SUPPOSITORY | Refills: 0 | Status: SHIPPED | OUTPATIENT
Start: 2024-11-03 | End: 2024-11-03 | Stop reason: RX

## 2024-11-03 RX ORDER — DOCUSATE SODIUM 100 MG/1
100 CAPSULE, LIQUID FILLED ORAL 2 TIMES DAILY PRN
Qty: 60 CAPSULE | Refills: 0 | Status: SHIPPED | OUTPATIENT
Start: 2024-11-03 | End: 2024-12-03

## 2024-11-03 NOTE — ED INITIAL ASSESSMENT (HPI)
PT states he has had BRBPR for since this am, states he has chronically had some rectal bleeding, also c/o mid abd pain.

## 2024-11-03 NOTE — ED PROVIDER NOTES
Patient Seen in: [facility] Emergency Department      History     Chief Complaint   Patient presents with    GI Bleeding     Stated Complaint: Eval-G/Abdominal Pain    Subjective:   The history is provided by the patient.         49 year old male with h/o meyer esophagus, thyroid d/o,   They notice that he bleeds more when he exerts himself.     Objective:     Past Medical History:    Anemia    Meyer esophagus    Blood disorder    Disorder of thyroid    Esophageal reflux    H/O gastroesophageal reflux (GERD)    Hemorrhoids    Hyperlipidemia    Thyroid disease    Vitiligo              Past Surgical History:   Procedure Laterality Date    Colonoscopy      Colonoscopy N/A 07/19/2022    Procedure: COLONOSCOPY/ESOPHAGOGASTRODUODENOSCOPY;  Surgeon: Héctor Fischer MD;  Location: [facility] ENDO    Colonoscopy      Colonoscopy  09/20/2023    Egd  06/05/2019    Egd  02/11/2020    Egd  01/25/2022    Egd  07/19/2022    Egd  09/20/2023    Other surgical history      Neck discectomy C5-C6    Upper gi endoscopy,exam                  Social History     Socioeconomic History    Marital status:    Tobacco Use    Smoking status: Never    Smokeless tobacco: Never   Vaping Use    Vaping status: Never Used   Substance and Sexual Activity    Alcohol use: Never    Drug use: Never                  Physical Exam     ED Triage Vitals [11/02/24 6327]   /73   Pulse 69   Resp 18   Temp 98.2 °F (36.8 °C)   Temp src Temporal   SpO2 99 %   O2 Device None (Room air)       Current Vitals:   Vital Signs  BP: 118/82  Pulse: 70  Resp: 16  Temp: 98.2 °F (36.8 °C)  Temp src: Temporal  MAP (mmHg): 90    Oxygen Therapy  SpO2: 98 %  O2 Device: None (Room air)        Physical Exam  ***      ED Course     Labs Reviewed   CBC WITH DIFFERENTIAL WITH PLATELET - Abnormal; Notable for the following components:       Result Value    HGB 12.0 (*)     HCT 37.7 (*)     MCV 73.8 (*)     MCH 23.5 (*)     RDW 15.3 (*)     All other components  within normal limits   BASIC METABOLIC PANEL (8) - Abnormal; Notable for the following components:    Glucose 133 (*)     Calculated Osmolality 296 (*)     All other components within normal limits            MDM      Pulse Ox: 98%, {Pulse ox analysis:65645}, ***    Medications - No data to display            Medical Decision Making      Disposition and Plan     Clinical Impression:  No diagnosis found.     Disposition:  There is no disposition on file for this visit.  There is no disposition time on file for this visit.    Follow-up:  No follow-up provider specified.        Medications Prescribed:  Current Discharge Medication List              Supplementary Documentation:                                                          time.      GCS: GCS eye subscore is 4. GCS verbal subscore is 5. GCS motor subscore is 6.      Sensory: Sensation is intact. No sensory deficit.      Motor: Motor function is intact. No weakness.   Psychiatric:         Attention and Perception: Attention normal.         Mood and Affect: Mood normal.         Behavior: Behavior normal. Behavior is cooperative.             ED Course     Labs Reviewed   CBC WITH DIFFERENTIAL WITH PLATELET - Abnormal; Notable for the following components:       Result Value    HGB 12.0 (*)     HCT 37.7 (*)     MCV 73.8 (*)     MCH 23.5 (*)     RDW 15.3 (*)     All other components within normal limits   BASIC METABOLIC PANEL (8) - Abnormal; Notable for the following components:    Glucose 133 (*)     Calculated Osmolality 296 (*)     All other components within normal limits   TROPONIN I HIGH SENSITIVITY - Normal     EKG    Rate, intervals and axes as noted on EKG Report.  Rate: 64  Rhythm: Sinus Rhythm  Reading: NSR           MDM      Pulse Ox: 98%, Normal, room air    Medications - No data to display    This is a patient who is well followed by GI.  Today he seems to have rectal bleeding without any other symptoms along with finding of hemorrhoids and anal fissure.  He initially reported some dyspnea on exertion, troponin and EKG are reassuring.  He denies having that before and only mentioned it because he thought it was related to the possible bleeding.  Will treat as hemorrhoids with Anusol suppositories, will switch to cream of suppository unavailable.  Colace within the stool.  Patient interested in following up closely with his GI to find the source of this bleeding, advised that he call the office as he is stable at this time return for any worsening symptoms.      Disposition and Plan     Clinical Impression:  1. Rectal bleeding    2. Dyspnea on exertion    3. Anal fissure         Disposition:  Discharge  11/3/2024  1:58 am    Follow-up:  Shawn De La Cruz MD  [address]  [facility] [address]  [phone]    Schedule an appointment as soon as possible for a visit  Call for next available appointment          Medications Prescribed:  Discharge Medication List as of 11/3/2024  2:03 AM        START taking these medications    Details   docusate sodium 100 MG Oral Cap Take 1 capsule (100 mg total) by mouth 2 (two) times daily., Normal, Disp-60 capsule, R-0      hydrocortisone 2.5 % External Cream Place 1 Application rectally 2 (two) times daily for 14 days., Normal, Disp-28 g, R-0      hydrocortisone 25 MG Rectal Suppos Place 1 suppository (25 mg total) rectally 2 (two) times daily as needed for Hemorrhoids., Normal, Disp-24 suppository, R-0                 Supplementary Documentation:

## 2024-11-07 ENCOUNTER — TELEPHONE (OUTPATIENT)
Facility: CLINIC | Age: 49
End: 2024-11-07

## 2024-11-07 NOTE — TELEPHONE ENCOUNTER
Patient wife is calling to schedule ER follow up appointment due to rectal bleeding.  Patient scheduled first available for march 2025.  Please call

## 2024-11-07 NOTE — TELEPHONE ENCOUNTER
Dr De La Cruz,    Pt ws in ER on 11/03/2024    He was discharged with hydrocortisone rectal suppositories which he is using BID for anal fissure.    Pt's wife is still concerned because he is still having a lot of bleeding    Every time he has a bowel movement there is always blood    He is not dizzy.  When he goes outside to do yard work he does become short of breath    Cardiac work up in ER negative    He takes a [employer] Blood Builder, one tablet daily    The pt's wife does not think the anal fissure would cause this amount of bleeding    I gave them your first available follow up appt but wife is concerned and feels her  should be seen sooner    Yaneli has openings this coming Monday

## 2024-11-08 NOTE — TELEPHONE ENCOUNTER
Mr. Christine's bleeding has indeed been severe.  He was recently administered intravenous iron infusions for his iron deficiency anemia.    Recent video capsule enteroscopy examination 5/29/2024 and previous EGD/colonoscopy examination 9/20/2023 AND 7/19/2022 reviewed.    So far we are repeatedly seeing reflux esophagitis and associated lower esophageal bleeding    Recent CBC 11/3/2024 shows hemoglobin 12.0g still microcytic.    The most important next steps would be:    Mr. Christine should be taking omeprazole/pantoprazole at least once daily, indefinitely  Mr. Christine should see Dr. De Franco or Dr. Raheem Wood or general surgeon of his choice regarding the rectal bleeding suspected hemorrhoids or anal fissure.  I would not be able to do anything further for his rectal bleeding as a GI specialist other than make a trial of banding his hemorrhoids after the 2 recent reassuring colonoscopy examinations.  Could discuss with Shweta Akers next week whether to repeat sigmoidoscopy examination and possible banding of hemorrhoids.  That would be optional.  That would be a sigmoidoscopy procedure at [facility].    - eileen

## 2024-11-08 NOTE — TELEPHONE ENCOUNTER
Dr. De La Cruz -    I spoke to patient's wife, Jyoti SIERRA per HAI    I relayed your recommendations below.  She perfer's Oniel to be seen Monday with Yaneli to discuss options/next steps. I scheduled patient for the appt.    I also provided the phone number to Dr. Wood's office. I advised making a consult appointment to discuss Oniel's symptoms and possible interventions.     She verbalized  understanding and appreciated the call.  Location, date and time with Yaneli confirmed.    Your Appointments      Monday November 11, 2024 10:00 AM  Follow Up Visit with Yaneli Akers PA-C  [facility] ([facility]) [address] [address]  [phone]          KAYE Groves

## 2024-11-11 ENCOUNTER — LAB ENCOUNTER (OUTPATIENT)
Dept: LAB | Facility: HOSPITAL | Age: 49
End: 2024-11-11
Attending: PHYSICIAN ASSISTANT
Payer: COMMERCIAL

## 2024-11-11 ENCOUNTER — OFFICE VISIT (OUTPATIENT)
Facility: CLINIC | Age: 49
End: 2024-11-11
Payer: COMMERCIAL

## 2024-11-11 VITALS
HEIGHT: 70 IN | SYSTOLIC BLOOD PRESSURE: 119 MMHG | WEIGHT: 204 LBS | BODY MASS INDEX: 29.2 KG/M2 | DIASTOLIC BLOOD PRESSURE: 81 MMHG

## 2024-11-11 DIAGNOSIS — K64.4 EXTERNAL HEMORRHOIDS: Primary | ICD-10-CM

## 2024-11-11 DIAGNOSIS — K21.9 GASTROESOPHAGEAL REFLUX DISEASE WITHOUT ESOPHAGITIS: ICD-10-CM

## 2024-11-11 LAB
BASOPHILS # BLD AUTO: 0.06 X10(3) UL (ref 0–0.2)
BASOPHILS NFR BLD AUTO: 1 %
DEPRECATED HBV CORE AB SER IA-ACNC: 5 NG/ML
DEPRECATED RDW RBC AUTO: 43.8 FL (ref 35.1–46.3)
EOSINOPHIL # BLD AUTO: 0.16 X10(3) UL (ref 0–0.7)
EOSINOPHIL NFR BLD AUTO: 2.6 %
ERYTHROCYTE [DISTWIDTH] IN BLOOD BY AUTOMATED COUNT: 15.9 % (ref 11–15)
HCT VFR BLD AUTO: 42.8 %
HGB BLD-MCNC: 12.9 G/DL
IMM GRANULOCYTES # BLD AUTO: 0.04 X10(3) UL (ref 0–1)
IMM GRANULOCYTES NFR BLD: 0.7 %
IRON SATN MFR SERPL: 5 %
IRON SERPL-MCNC: 26 UG/DL
LYMPHOCYTES # BLD AUTO: 1.4 X10(3) UL (ref 1–4)
LYMPHOCYTES NFR BLD AUTO: 23.2 %
MCH RBC QN AUTO: 23.2 PG (ref 26–34)
MCHC RBC AUTO-ENTMCNC: 30.1 G/DL (ref 31–37)
MCV RBC AUTO: 77 FL
MONOCYTES # BLD AUTO: 0.4 X10(3) UL (ref 0.1–1)
MONOCYTES NFR BLD AUTO: 6.6 %
NEUTROPHILS # BLD AUTO: 3.98 X10 (3) UL (ref 1.5–7.7)
NEUTROPHILS # BLD AUTO: 3.98 X10(3) UL (ref 1.5–7.7)
NEUTROPHILS NFR BLD AUTO: 65.9 %
PLATELET # BLD AUTO: 231 10(3)UL (ref 150–450)
RBC # BLD AUTO: 5.56 X10(6)UL
TIBC SERPL-MCNC: 502 UG/DL (ref 250–425)
TRANSFERRIN SERPL-MCNC: 337 MG/DL (ref 215–365)
WBC # BLD AUTO: 6 X10(3) UL (ref 4–11)

## 2024-11-11 PROCEDURE — 85025 COMPLETE CBC W/AUTO DIFF WBC: CPT | Performed by: PHYSICIAN ASSISTANT

## 2024-11-11 PROCEDURE — 36415 COLL VENOUS BLD VENIPUNCTURE: CPT | Performed by: PHYSICIAN ASSISTANT

## 2024-11-11 PROCEDURE — 84466 ASSAY OF TRANSFERRIN: CPT | Performed by: PHYSICIAN ASSISTANT

## 2024-11-11 PROCEDURE — 82728 ASSAY OF FERRITIN: CPT | Performed by: PHYSICIAN ASSISTANT

## 2024-11-11 PROCEDURE — 83540 ASSAY OF IRON: CPT | Performed by: PHYSICIAN ASSISTANT
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
## 2024-11-11 NOTE — PATIENT INSTRUCTIONS
Recommendations:  -make sure to limit time sitting on toilet   -Increase water intake to 64oz of water per day  -Increase exercise to 150 mins/week  -continue to use hydrocortisone suppositories  -get lab work done  -can do sitz baths, continue the bidet  -continue omeprazole     -If bowel habits change or constipation worsens, call our office sooner

## 2024-11-12 ENCOUNTER — TELEPHONE (OUTPATIENT)
Dept: SURGERY | Facility: CLINIC | Age: 49
End: 2024-11-12

## 2024-11-12 ENCOUNTER — OFFICE VISIT (OUTPATIENT)
Dept: SURGERY | Facility: CLINIC | Age: 49
End: 2024-11-12
Payer: COMMERCIAL

## 2024-11-12 VITALS — SYSTOLIC BLOOD PRESSURE: 122 MMHG | DIASTOLIC BLOOD PRESSURE: 78 MMHG | BODY MASS INDEX: 29 KG/M2 | WEIGHT: 204 LBS

## 2024-11-12 DIAGNOSIS — K64.0 GRADE I HEMORRHOIDS: Primary | ICD-10-CM

## 2024-11-12 PROCEDURE — 99204 OFFICE O/P NEW MOD 45 MIN: CPT | Performed by: SURGERY

## 2024-11-12 PROCEDURE — 46221 LIGATION OF HEMORRHOID(S): CPT | Performed by: SURGERY

## 2024-11-12 NOTE — PROCEDURES
[facility]     Operative Report    Patient Name:  Oniel Christine  MR:  [id number]  :  1975  DOS:  24    Preop Dx:  Grade I internal hemorrhoids  Postop Dx:  Grade I internal hemorrhoids  Procedure:  Ligation of hemorrhoids (CPT 62752)  Surgeon:  Raheem Wood MD  EBL: None  Complication:  None    INDICATION:  Pt is a 49 year old male who with grade I internal hemorrhoids who is scheduled for a banding procedure.    CONSENT:  An informed consent discussion was held with the patient regarding the nature of hemorrhoids, the treatment options and the details of the procedure.  The risks including but not limited to bleeding, wound infection, and recurrence were discussed.  The patient expressed understanding and want to proceed with the planned procedure.    TECHNIQUE:  The patient was placed in prone jackknife position.  The rectum and perineum were prep and draped in the usual fashion.  Lidocaine 5% ointment was used for topical anesthesia.  Rectal digital exam confirmed no other pathology.  The anoscope was gently inserted and advanced into the anal canal.  The right anterior hemorrhoidal tissue was visualized.  The Rubber Band Ligator was carefully applied with suction.  The ligator trigger was activated to produce rubber banding of the hemorrhoid.  The patient did not report pain and was discharged in stable condition.  Routine wound care instructions provided.    Raheem Wood MD

## 2024-11-12 NOTE — H&P
HPI:    Patient ID: Oniel Christine is a 49 year old male presenting with   Chief Complaint   Patient presents with    Hemorrhoids     Patient here for complaints of hemorrhoids, states they are bleeding and internal.  No pain.  No constipation or diarrhea problems.   .    Hemorrhoids        Past Medical History:    Anemia    Justin esophagus    Blood disorder    Disorder of thyroid    Esophageal reflux    H/O gastroesophageal reflux (GERD)    Hemorrhoids    Hyperlipidemia    Thyroid disease    Vitiligo     Past Surgical History:   Procedure Laterality Date    Colonoscopy      Colonoscopy N/A 07/19/2022    Procedure: COLONOSCOPY/ESOPHAGOGASTRODUODENOSCOPY;  Surgeon: Héctor Fischer MD;  Location: [facility] ENDO    Colonoscopy      Colonoscopy  09/20/2023    Egd  06/05/2019    Egd  02/11/2020    Egd  01/25/2022    Egd  07/19/2022    Egd  09/20/2023    Other surgical history      Neck discectomy C5-C6    Upper gi endoscopy,exam       Family History   Problem Relation Age of Onset    Other (Other) Father         Unknown caused    No Known Problems Mother      Social History     Socioeconomic History    Marital status:      Spouse name: Not on file    Number of children: Not on file    Years of education: Not on file    Highest education level: Not on file   Occupational History    Not on file   Tobacco Use    Smoking status: Never    Smokeless tobacco: Never   Vaping Use    Vaping status: Never Used   Substance and Sexual Activity    Alcohol use: Never    Drug use: Never    Sexual activity: Not on file   Other Topics Concern    Not on file   Social History Narrative    Not on file     Social Drivers of Health     Financial Resource Strain: Not on file   Food Insecurity: Not on file   Transportation Needs: Not on file   Physical Activity: Not on file   Stress: Not on file   Social Connections: Not on file   Housing Stability: Not on file       Review of Systems   Constitutional: Negative.    HENT: Negative.     Eyes:  Negative.    Respiratory: Negative.     Cardiovascular: Negative.    Gastrointestinal:  Positive for anal bleeding.   Endocrine: Negative.    Genitourinary: Negative.    Musculoskeletal: Negative.    Skin: Negative.    Allergic/Immunologic: Negative.    Neurological: Negative.    Hematological:  Does not bruise/bleed easily.   Psychiatric/Behavioral: Negative.             Current Outpatient Medications   Medication Sig Dispense Refill    docusate sodium 100 MG Oral Cap Take 1 capsule (100 mg total) by mouth 2 (two) times daily. 60 capsule 0    hydrocortisone 25 MG Rectal Suppos Place 1 suppository (25 mg total) rectally 2 (two) times daily as needed for Hemorrhoids. 24 suppository 0    hydrocortisone 2.5 % External Cream Place 1 Application rectally 2 (two) times daily for 14 days. 28 g 0    docusate sodium 100 MG Oral Cap Take 1 capsule (100 mg total) by mouth 2 (two) times daily as needed for constipation. 60 capsule 0    triamcinolone 0.1 % External Cream Apply topically 2 (two) times daily. 28.4 g 0    Omeprazole 40 MG Oral Capsule Delayed Release Take 1 capsule (40 mg total) by mouth 2 (two) times daily. 180 capsule 3    tadalafil 5 MG Oral Tab Take 1 tablet (5 mg total) by mouth daily.      Ferrous Sulfate 324 (65 Fe) MG Oral Tab EC Take 1 tablet by mouth daily.      methylPREDNISolone (MEDROL) 4 MG Oral Tablet Therapy Pack Dosepack: take as directed 1 each 0    albuterol 108 (90 Base) MCG/ACT Inhalation Aero Soln Inhale 2 puffs into the lungs every 4 (four) hours.      clobetasol 0.05 % External Cream Apply 0.05 % topically 2 (two) times daily.      Betaine HCl 300 MG Oral Tab Take 2,400 mg by mouth daily.      fluticasone propionate 50 MCG/ACT Nasal Suspension 1 spray by Nasal route 2 (two) times daily. 16 g 0    Omeprazole 40 MG Oral Capsule Delayed Release Take 1 capsule (40 mg total) by mouth 2 (two) times a day. Take 1 capsule by mouth daily before breakfast. 180 capsule 3    sucralfate 1 g Oral Tab Take  1 tablet (1 g total) by mouth 4 (four) times daily as needed. 120 tablet 3    FAMOTIDINE 40 MG Oral Tab TAKE 1 TABLET (40 MG TOTAL) BY MOUTH NIGHTLY AS NEEDED FOR HEARTBURN. 90 tablet 3    Levothyroxine Sodium 175 MCG Oral Tab Take 1 tablet (175 mcg total) by mouth before breakfast.         Allergies:Allergies[1]   PHYSICAL EXAM:   /78 (BP Location: Left arm, Patient Position: Sitting, Cuff Size: large)   Wt 204 lb (92.5 kg)   BMI 29.27 kg/m²   Physical Exam  Vitals reviewed.   Constitutional:       Appearance: Normal appearance. He is well-developed.   HENT:      Head: Normocephalic and atraumatic.   Cardiovascular:      Rate and Rhythm: Normal rate and regular rhythm.   Pulmonary:      Effort: Pulmonary effort is normal.      Breath sounds: Normal breath sounds.   Abdominal:      General: There is no distension.      Palpations: Abdomen is soft. There is no mass.      Tenderness: There is no abdominal tenderness. There is no guarding or rebound.   Musculoskeletal:         General: Normal range of motion.      Cervical back: Normal range of motion and neck supple.   Skin:     General: Skin is warm and dry.   Neurological:      Mental Status: He is alert and oriented to person, place, and time.   Psychiatric:         Speech: Speech normal.         Behavior: Behavior normal.                 ASSESSMENT/PLAN:   Diagnoses and all orders for this visit:    Grade I hemorrhoids    Discussed in detail with pt and options reviewed - literature provided.  For short term treatment of pain and swelling, I recommend sitz baths and topical hydrocortisone cream and tucks pads.  Intermittent stool softener therapy and laxatives may be of benefit.  Avoid NSAIDs and blood thinners as able. The patient was also instructed to limit time sitting on the commode. We discussed the long term benefits of a high fiber diet or supplements with additional fluid intake.    We briefly discussed more invasive therapies for hemorrhoid  disease, in the unlikely scenario of failed medical management.  These include banding, hemorrhoidectomy and hemorrhoid artery ligation/plication (THD procedure).    Pt wants to proceed with the banding in office today.           Raheem Wood MD  11/12/2024       [1] No Known Allergies

## 2024-11-13 ENCOUNTER — TELEPHONE (OUTPATIENT)
Dept: SURGERY | Facility: CLINIC | Age: 49
End: 2024-11-13

## 2024-11-13 NOTE — TELEPHONE ENCOUNTER
Patients wife states that the patient has a band and it come off this morning. She states that Dr Wood is the physician that put it on so she wants to know what should the patient do? Patient was seen yesterday 11/12/24 for consult.

## 2024-11-13 NOTE — TELEPHONE ENCOUNTER
Spoke with patient's wife who states the patient was cutting the grass and cleaning the yard, then noticed he lost a band and he is having some bleeding.  Advised wife that is is not abnormal to lose a band right after a banding, but to treat it like a hemorrhoid, doing a sitz bath and using the hemorrhoidal cream, and to take it easy today.  Patient has an appointment in December for a second banding, and he was advised to keep the appointment.

## 2024-11-26 ENCOUNTER — OFFICE VISIT (OUTPATIENT)
Dept: SURGERY | Facility: CLINIC | Age: 49
End: 2024-11-26
Payer: COMMERCIAL

## 2024-11-26 VITALS
WEIGHT: 204 LBS | SYSTOLIC BLOOD PRESSURE: 107 MMHG | DIASTOLIC BLOOD PRESSURE: 72 MMHG | BODY MASS INDEX: 29 KG/M2 | HEART RATE: 65 BPM

## 2024-11-26 DIAGNOSIS — K64.0 GRADE I HEMORRHOIDS: Primary | ICD-10-CM

## 2024-11-26 PROCEDURE — 46221 LIGATION OF HEMORRHOID(S): CPT | Performed by: SURGERY

## 2024-11-26 NOTE — PROCEDURES
[facility]     Operative Report    Patient Name:  Oniel Christine  MR:  [id number]  :  1975  DOS:  24    Preop Dx:  Grade I internal hemorrhoids  Postop Dx:  Grade I internal hemorrhoids  Procedure:  Ligation of hemorrhoids (CPT 11894)  Surgeon:  Raheem Wood MD  EBL: None  Complication:  None    INDICATION:  Pt is a 49 year old male who with grade I internal hemorrhoids who is scheduled for a banding procedure.  Of note, he reports that 8 hours after the first banding session, he saw two black bands in the toilet after a bowel movement.    CONSENT:  An informed consent discussion was held with the patient regarding the nature of hemorrhoids, the treatment options and the details of the procedure.  The risks including but not limited to bleeding, wound infection, and recurrence were discussed.  The patient expressed understanding and want to proceed with the planned procedure.    TECHNIQUE:  The patient was placed in prone jackknife position.  The rectum and perineum were prep and draped in the usual fashion.  Lidocaine 5% ointment was used for topical anesthesia.  Rectal digital exam confirmed no other pathology.  The anoscope was gently inserted and advanced into the anal canal.  The right anterior hemorrhoidal tissue was visualized.  The Rubber Band Ligator was carefully applied with suction.  The ligator trigger was activated to produce rubber banding of the hemorrhoid.  The patient did not report pain and was discharged in stable condition.  Routine wound care instructions provided.    Raheem Wood MD

## 2024-12-10 ENCOUNTER — OFFICE VISIT (OUTPATIENT)
Dept: SURGERY | Facility: CLINIC | Age: 49
End: 2024-12-10
Payer: COMMERCIAL

## 2024-12-10 VITALS — SYSTOLIC BLOOD PRESSURE: 126 MMHG | HEART RATE: 73 BPM | DIASTOLIC BLOOD PRESSURE: 86 MMHG

## 2024-12-10 DIAGNOSIS — K64.0 GRADE I HEMORRHOIDS: Primary | ICD-10-CM

## 2024-12-10 PROCEDURE — 46221 LIGATION OF HEMORRHOID(S): CPT | Performed by: SURGERY

## 2024-12-10 NOTE — PROCEDURES
[facility]     Operative Report    Patient Name:  Oniel Christine  MR:  [id number]  :  1975  DOS:  12/10/24    Preop Dx:  Grade I internal hemorrhoids  Postop Dx:  Grade I internal hemorrhoids  Procedure:  Ligation of hemorrhoids (CPT 10600)  Surgeon:  Raheem Wood MD  EBL: None  Complication:  None    INDICATION:  Pt is a 49 year old male who with grade I internal hemorrhoids who is scheduled for a banding procedure.    CONSENT:  An informed consent discussion was held with the patient regarding the nature of hemorrhoids, the treatment options and the details of the procedure.  The risks including but not limited to bleeding, wound infection, and recurrence were discussed.  The patient expressed understanding and want to proceed with the planned procedure.    TECHNIQUE:  The patient was placed in prone jackknife position.  The rectum and perineum were prep and draped in the usual fashion.  Lidocaine 5% ointment was used for topical anesthesia.  Rectal digital exam confirmed no other pathology.  The anoscope was gently inserted and advanced into the anal canal.  The left lateral hemorrhoidal tissue was visualized.  The Rubber Band Ligator was carefully applied with suction.  The ligator trigger was activated to produce rubber banding of the hemorrhoid.  The patient did not report pain and was discharged in stable condition.  Routine wound care instructions provided.    Raheem Wood MD

## 2024-12-24 ENCOUNTER — OFFICE VISIT (OUTPATIENT)
Dept: SURGERY | Facility: CLINIC | Age: 49
End: 2024-12-24

## 2024-12-24 VITALS
BODY MASS INDEX: 29 KG/M2 | DIASTOLIC BLOOD PRESSURE: 75 MMHG | HEART RATE: 77 BPM | WEIGHT: 204 LBS | SYSTOLIC BLOOD PRESSURE: 116 MMHG

## 2024-12-24 DIAGNOSIS — K64.0 GRADE I HEMORRHOIDS: Primary | ICD-10-CM

## 2024-12-24 PROCEDURE — 46221 LIGATION OF HEMORRHOID(S): CPT | Performed by: SURGERY

## 2024-12-24 NOTE — PROCEDURES
[facility]     Operative Report    Patient Name:  Oniel Christine  MR:  [id number]  :  1975  DOS:  23    Preop Dx:  Grade I internal hemorrhoids  Postop Dx:  Grade I internal hemorrhoids  Procedure:  Ligation of hemorrhoids (CPT 44452)  Surgeon:  Raheem Wood MD  EBL: None  Complication:  None    INDICATION:  Pt is a 49 year old male who with grade I internal hemorrhoids who is scheduled for a banding procedure.    CONSENT:  An informed consent discussion was held with the patient regarding the nature of hemorrhoids, the treatment options and the details of the procedure.  The risks including but not limited to bleeding, wound infection, and recurrence were discussed.  The patient expressed understanding and want to proceed with the planned procedure.    TECHNIQUE:  The patient was placed in prone jackknife position.  The rectum and perineum were prep and draped in the usual fashion.  Lidocaine 5% ointment was used for topical anesthesia.  Rectal digital exam confirmed no other pathology.  The anoscope was gently inserted and advanced into the anal canal.  The right anterior hemorrhoidal tissue was visualized.  The Rubber Band Ligator was carefully applied with suction.  The ligator trigger was activated to produce rubber banding of the hemorrhoid.  The patient did not report pain and was discharged in stable condition.  Routine wound care instructions provided.    Raheem Wood MD

## 2025-02-05 ENCOUNTER — APPOINTMENT (OUTPATIENT)
Dept: GENERAL RADIOLOGY | Age: 50
End: 2025-02-05
Attending: STUDENT IN AN ORGANIZED HEALTH CARE EDUCATION/TRAINING PROGRAM
Payer: COMMERCIAL

## 2025-02-05 ENCOUNTER — HOSPITAL ENCOUNTER (OUTPATIENT)
Age: 50
Discharge: HOME | End: 2025-02-05
Attending: STUDENT IN AN ORGANIZED HEALTH CARE EDUCATION/TRAINING PROGRAM
Payer: COMMERCIAL

## 2025-02-05 VITALS
TEMPERATURE: 99 F | DIASTOLIC BLOOD PRESSURE: 55 MMHG | RESPIRATION RATE: 16 BRPM | HEART RATE: 76 BPM | SYSTOLIC BLOOD PRESSURE: 118 MMHG | OXYGEN SATURATION: 99 %

## 2025-02-05 DIAGNOSIS — J06.9 VIRAL URI WITH COUGH: Primary | ICD-10-CM

## 2025-02-05 PROCEDURE — 99213 OFFICE O/P EST LOW 20 MIN: CPT

## 2025-02-05 PROCEDURE — 99214 OFFICE O/P EST MOD 30 MIN: CPT

## 2025-02-05 PROCEDURE — 71046 X-RAY EXAM CHEST 2 VIEWS: CPT | Performed by: STUDENT IN AN ORGANIZED HEALTH CARE EDUCATION/TRAINING PROGRAM

## 2025-02-05 RX ORDER — ALBUTEROL SULFATE 90 UG/1
2 INHALANT RESPIRATORY (INHALATION) EVERY 4 HOURS PRN
Qty: 1 EACH | Refills: 0 | Status: SHIPPED | OUTPATIENT
Start: 2025-02-05 | End: 2025-03-07

## 2025-02-05 NOTE — DISCHARGE INSTRUCTIONS
Your chest x-ray shows no signs of pneumonia.    I did refill your albuterol inhaler as requested.    At this time your exam and evaluation are consistent with a viral infection. Viral infections do not respond to antibiotics and are treated symptomatically. Ensure to rest and maintain hydration. Viral infections can progress and can lead to bacterial infections. If you develop any new, progressing, changing, or worsening signs or symptoms, please present immediately to your primary care physician for reassessment. If you develop any difficulty breathing, chest pain, shortness of breath, difficulty swallowing, drooling, signs or symptoms of dehydration, or any other concerning symptoms, call 911 or present immediately to the emergency department.
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
## 2025-02-05 NOTE — ED INITIAL ASSESSMENT (HPI)
Patient arrived ambulatory to room c/o symptoms that started 2 weeks ago. +nasal congestion +productive cough. No fevers. No n/v/d. Easy non labored respirations. No distress.

## 2025-03-04 ENCOUNTER — OFFICE VISIT (OUTPATIENT)
Dept: GASTROENTEROLOGY | Facility: CLINIC | Age: 50
End: 2025-03-04

## 2025-03-04 ENCOUNTER — TELEPHONE (OUTPATIENT)
Dept: GASTROENTEROLOGY | Facility: CLINIC | Age: 50
End: 2025-03-04

## 2025-03-04 VITALS
HEART RATE: 72 BPM | HEIGHT: 70 IN | SYSTOLIC BLOOD PRESSURE: 105 MMHG | WEIGHT: 208.38 LBS | DIASTOLIC BLOOD PRESSURE: 73 MMHG | BODY MASS INDEX: 29.83 KG/M2

## 2025-03-04 DIAGNOSIS — K64.0 GRADE I HEMORRHOIDS: ICD-10-CM

## 2025-03-04 DIAGNOSIS — D50.0 IRON DEFICIENCY ANEMIA DUE TO CHRONIC BLOOD LOSS: Primary | ICD-10-CM

## 2025-03-04 DIAGNOSIS — K21.9 GASTROESOPHAGEAL REFLUX DISEASE WITHOUT ESOPHAGITIS: ICD-10-CM

## 2025-03-04 PROCEDURE — 99214 OFFICE O/P EST MOD 30 MIN: CPT | Performed by: INTERNAL MEDICINE

## 2025-03-04 RX ORDER — OMEPRAZOLE 20 MG/1
20 CAPSULE, DELAYED RELEASE ORAL
Qty: 90 CAPSULE | Refills: 3 | Status: SHIPPED | OUTPATIENT
Start: 2025-03-04 | End: 2025-04-03

## 2025-03-04 NOTE — PROGRESS NOTES
** Scroll down for HPI. **    ASSESSMENT/PLAN:     49 year old healthy gentleman, moderately elevated BMI 30.2-->29.9 who was seen 5/22/2024 for follow-up of a previous history of severe GERD symptoms and active reflux esophagitis with ulcerations on EGD examination; then unexpected development of severe iron deficiency anemia June 2023.    Treated with iron infusions for the iron deficiency; iron deficient again on labs of 2/23/2025.    3 EGD examinations and 2 EGD/colonoscopy examinations completed 6/5/2019, 2/11/2020, 1/25/2022, 7/19/2022, 9/20/2023.    Repeat exams as above have shown reflux esophagitis with friable linear erosions to zak esophageal ulcers; small gastric glandular polyp; repeatedly normal colonoscopy examination with ileoscopy.  Most recent exam 9/20/2023 was EGD/colonoscopy exam.    No H. pylori on gastric biopsies/polyps.    Normal biopsies of duodenum/terminal ileum.    Question of some intestinal metaplasia/short segment Justin's on initial biopsies 2/11/2020.    Mr. Christine was describing variable but consistent rectal bleeding with bowel movements in 2024 concerning for hemorrhoidal type bleeding contributing to the anemia.    Mr. Christine and his partner return for follow-up 5/22/2024 asking for further evaluation of the recent iron deficiency anemia.    Mr. Christine anf his wife return for follow-up 3/4/2025 having completed 4 banding treatments Dr. Raheem Wood for the hemorrhoids November - December 2024, with resolution of the rectal bleeding.  Ongoing impressive iron deficiency anemia on labs of 2/21/2025.    Suggest:    Severe iron deficiency anemia  Treated either here or elsewhere with what he recalls to be 10 intravenous iron infusions [facility] after June 2023 for this concern.  Repeated EGD and colonoscopy examinations showing only reflux esophagitis with linear esophageal erosions and ulcerations  Biopsies of duodenal and terminal ileal mucosa have back completely normal  Reassuring  video capsule enteroscopy (VCE) small intestine in May 2024; no ulcerations or inflammatory bowel disease  Reassuring, normal UA 5/23/2024    Indefinite PPI therapy as below  Suspected component of chronic hemorrhoidal type bleeding      Low-normal iron studies on labs 2/23/2024  Again iron deficient with hemoglobin 11.7g MCV 70.7 serum ferritin 7 on labs of 2/21/2025 ([facility] system)  Continue to monitor CBC and iron studies  Caution with aspirin, NSAIDs    Schedule EGD examination to follow-up on the reflux esophagitis  Draw celiac studies with next CBC and iron studies in June 2.  Severe GERD symptoms with reflux esophagitis, esophageal erosions and ulceration; 1 biopsy showing intestinal metaplasia February 2020  Subsequent biopsies showed only reflux esophagitis  Sounds like some variability in PPI dosing; may have come off it after one or more of the repeated exams past 5 years  Currently taking the omeprazole 40 mg at least once daily; supplementing with famotidine as needed for GERD symptoms.  Today we discussed and agreed to stepdown to omeprazole 20 mg dosing, take it 5 or 6 PM each night.  Schedule follow-up EGD examination to reassess    Taking occasional HCl supplement as below; continue to monitor    3.  Colon cancer screening, average risk  Diminutive hyperplastic colon polyp removed during colonoscopy examination 7/19/2022  No colon polyps on subsequent colonoscopy examination 9/20/2023  Next colonoscopy recommended July 2032 or as per clinical course          Prior to this encounter, I spent over 10 minutes preparing for the visit, including reviewing documents from other specialties as well as from PCP and going over test results. During and after the face to face encounter, I spent an additional 40 minutes today discussing the above and counseling and educating this patient and family, reviewing chart notes and data and documenting clinical information in the EHR, independently interpreting  results and communicating results to the patient/family and composing this comprehensive note, ordering medications or testing, referring and communicating with other healthcare providers, and care coordination with the patient's other providers.      Digital transcription software was utilized to produce this note. The note was proofread for content only. Typographical errors may remain.      Office visit 3/4/2025:  HPI:    Patient ID: Oniel Christine is a 49 year old gentleman mildly elevated BMI 30.2 with a complex history of severe GERD symptoms and reflux esophagitis on exams going back to 2020 as below, question of short segment Justin's esophagus or intestinal metaplasia of the gastric cardia.    Oniel and his wife return for follow-up today.  He is feeling well.  The two of them look great.    Oniel did complete four office hemorrhoid banding treatments with Dr. Raheem Wood:  11/12/2024  11/26/2024  12/10/2024  12/24/2024    He tolerated these well, not especially painful.  Though Oniel is a bit evasive as to the amount and frequency of rectal bleeding prior to the banding treatments, it sounds like there has been no further rectal bleeding since.    Oniel has a bidet in the bathroom, uses that along with medicated wipes.  Seems to be feeling well.    Not currently taking the iron pills, although states they do not cause any symptoms such as upset stomach or constipation.    Currently taking omeprazole 40 mg daily.  Takes famotidine as needed for additional relief of GERD symptoms.    Osmar Brothpriscila labs drawn 2/21/2025:  Reassuring CBC mild anemia with WBC 5700 hemoglobin 11.7g platelets 315,000 MCV 70.7  Serum iron 38, ferritin 7    BUN 19, creatinine 1.1  Serum albumin 4.2  AST 10 ALT 9 alk phosphatase 92    ======================  Previous visit 5/22/2024:     Oniel Christine is a 49 year old gentleman mildly elevated BMI 30.2 with a complex history of severe GERD symptoms and reflux esophagitis on exams going back to  2020 as below, question of short segment Justin's esophagus or intestinal metaplasia of the gastric cardia.    As per previous notes, Mr. Christine turned up with severe iron deficiency anemia on labs of 6/2/2023 with hemoglobin 7.9g MCV 57.2.  Iron indices consistent with iron deficiency.    Unclear whether he went off the PPI medication after a follow-up EGD examination showed interval resolution of previous severe reflux esophagitis.    Treated either here or elsewhere with what he recalls to be 10 intravenous iron infusions for this concern.  Not transfused.    Only symptom of the worsening anemia was worsening fatigue.    Four EGD examinations have been performed past 4 years, two of which were pan-endoscopy examinations.  So far the only etiology identified for chronic blood loss anemia is severe reflux esophagitis.  Reassuring colonoscopy examination.    On review of systems, Mr. Christine describes frequent GERD symptoms, previously severe before starting the PPI medication.  His wife describes him taking up to 10 Tums per evening on the bad nights.  He occasionally awakens coughing and choking.    Otherwise no abdominal pain, certainly no chronic abdominal symptoms no diarrhea.  He sees rare small amounts of blood with bowel movements and wiping suggestive of hemorrhoidal type bleeding.    Weights have been healthy/stable.    Mr. Christine describes good compliance with daily omeprazole medication.  Has researched GERD and digestion and is taking betaine HCl.    Mr. Christine and his wife return today to discuss the iron deficiency and completing the workup.  They are concerned with the disproportionate findings so far of only reflux esophagitis.  They have researched acid suppression therapy for his GERD and iron deficiency.  Taking the betaine HCl supplement as above.  They actually have educated themselves on the video capsule endoscopy exam.    Reassuring family history.  Mother suffered thyroid cancer  Sister:  Non-Hodgkin's lymphoma  Grandmother: Kidney cancer    Never smoker      We reviewed recent labs.  Some labs are from the [employer] system which are not directly visible to me.  Wife opens and scrolls through some of his labs on the amanda on her mobile phone.    1/24/2020:  Normal CBC, hemoglobin 14.1g MCV 81.9    3/19/2022:  WBC 6500, hemoglobin 10.6g platelets 295,000    6/1/2022:  WBC 5.33, hemoglobin 10.4g, platelets 251,000    6/2/2023:  WBC 4400, hemoglobin 7.9g MCV 57.2, platelets 234,000    10/17/2023:  WBC 5.99, hemoglobin 14.0g MCV 80.4, platelets 175,000  Serum iron 85, 22% iron saturation, ferritin 89    6/16/2023:  Serum iron 26, 6% iron saturation, ferritin 2    8/17/2023:  WBC 4000, hemoglobin 12.6g MCV 73.7, platelets 235,000  Serum iron 33, 8% iron saturation, ferritin 14    2/23/2024 (post iron infusions?):  WBC 5.66, hemoglobin 14.1g platelets 222,000  Serum iron 86, ferritin 20      Wt Readings from Last 20 Encounters:   03/04/25 208 lb 6.4 oz (94.5 kg)   12/24/24 204 lb (92.5 kg)   11/26/24 204 lb (92.5 kg)   11/12/24 204 lb (92.5 kg)   11/11/24 204 lb (92.5 kg)   11/02/24 205 lb (93 kg)   07/14/24 205 lb (93 kg)   06/10/24 203 lb 3.2 oz (92.2 kg)   05/28/24 205 lb (93 kg)   05/27/24 205 lb (93 kg)   05/22/24 210 lb 11.2 oz (95.6 kg)   09/12/23 195 lb (88.5 kg)   06/22/23 206 lb (93.4 kg)   07/19/22 204 lb (92.5 kg)   03/19/22 200 lb (90.7 kg)   01/25/22 200 lb (90.7 kg)   11/05/21 200 lb (90.7 kg)   11/05/20 207 lb (93.9 kg)   02/07/20 185 lb (83.9 kg)   01/21/20 190 lb (86.2 kg)           HPI    Review of Systems    =======================  EGD examination Dr. Héctor Fischer 6/5/2019 at [facility]:    Preoperative diagnosis: Gastroesophageal reflux disease    Findings:  \"In the distal esophagus, there multiple linear erosions indicative of reflux esophagitis with circumferential erythema, and friable erosions at the squamocolumnar junction noted at 37 cm, all consistent with LA grade D reflux  esophagitis.\"  Mild nonspecific gastropathy in the stomach.    Plan was to increase PPI dosing to twice daily.    PATH:    Stomach; biopsy:   Gastric oxyntic type mucosa without significant histopathology  Diff-quik stain (with reactive positive control) is negative for Helicobacter pylori-like organisms    =======================  Esophagogastroduodenoscopy (EGD) Report           Oniel Christine      1975 Age 44 year old   PCP None Pcp Endoscopist Héctor Fischer MD      Date of procedure: 2020     Procedure: EGD w/ biopsy      Pre-operative diagnosis: GERD with esophagitis     Post-operative diagnosis: GERD with esophagitis, hiatal hernia, possible Justin's esophagus     Sedation: monitored anesthesia care (MAC)     Consent: We discussed the risks/benefits and alternatives to this procedure, as well as the planned sedation. Informed consent was obtained from the patient after the risks of the procedure were discussed, including but not limited to bleeding, perforation, aspiration, infection, or possibility of a missed lesion as well as the risks of anesthesia including but not limited to cardiopulmonary complications. The patient signed informed consent and elected to proceed with EGD with intervention [i.e. Biopsy, control of bleeding, dilatation, polypectomy, endoscopic mucosal resection, etc.] as indicated.     EGD procedure: The patient was placed in the left lateral decubitus position and begun on continuous blood pressure pulse oximetry and EKG monitoring and this was maintained throughout the procedure. Once an adequate level of sedation was obtained a bite block was placed. Then the lubricated tip of the [id number] diagnostic video upper endoscope was carefully inserted and advanced using direct visualization into the posterior pharynx and ultimately into the esophagus, stomach, and duodenum. Air was then withdrawn and the endoscope was removed. The patient tolerated the procedure well.      Estimated blood loss: insignificant     Specimens collected:  Esophageal biopsies     Complications: none     EGD findings:       1. Esophagus: The squamocolumnar junction was noted at 38 cm and appeared irregular with several short linear erosions consistent with LA grade B reflux esophagitis. There was a 0.5-1.0 cm x 1.0-2.0 cm tongue of salmon colored mucosa possibly representing Justin's esophagus. The segment was examined with high definition white light and narrow band imaging without any nodules or other mucosal abnormalities noted. Multiple cold forceps biopsies were obtained. The esophageal mucosa appeared unremarkable otherwise.  2. Stomach: There was a 2 cm sliding hiatal hernia. The stomach distended normally. Normal rugal folds were seen. The pylorus was patent. The gastric mucosa appeared unremarkable. Retroflexion revealed a normal fundus and cardia.   3. Duodenum: The duodenal mucosa appeared normal in the 1st and 2nd portion of the duodenum.      Impression:  1. GERD with esophagitis  2. 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 Small sliding hiatal hernia  4. Otherwise, unremarkable upper endoscopy     Recommend:  1. Await pathology results  2. Continue your current medication including pantoprazole once daily   3. Follow up in GI clinic and with your primary care physician on a routine basis.    PATH:    Distal esophagus; biopsy:   Squamocolumnar junctional mucosa with focal incomplete intestinal metaplasia in a background of changes consistent with mild reflux esophagitis (see comment).  Diff-Quik stain negative for Helicobacter pylori organisms (appropriate control).  No dysplasia identified.     Comment:  Differential considerations for these findings include intestinal metaplasia of the gastric cardia and Justin's esophagus.  Clinical correlation is recommended.    =======================  Esophagogastroduodenoscopy (EGD) Report           Oniel Christine      1975 Age 46 year old    PCP JAY VIRCHOW Endoscopist Héctor Fischer MD      Date of procedure: 01/25/2022     Procedure: EGD w/ biopsy      Pre-operative diagnosis: GERD, Justin's esophagus     Post-operative diagnosis: gastric polyps, hiatal hernia, esophagitis     Sedation:  monitored anesthesia care (MAC)     Consent: We discussed the risks/benefits and alternatives to this procedure, as well as the planned sedation. Informed consent was obtained from the patient after the risks of the procedure were discussed, including but not limited to bleeding, perforation, aspiration, infection, or possibility of a missed lesion as well as the risks of anesthesia including but not limited to cardiopulmonary complications. The patient signed informed consent and elected to proceed with EGD with intervention [i.e. Biopsy, control of bleeding, dilatation, polypectomy, endoscopic mucosal resection, etc.] as indicated.     EGD procedure: The patient was placed in the left lateral decubitus position and begun on continuous blood pressure pulse oximetry and EKG monitoring and this was maintained throughout the procedure. Once an adequate level of sedation was obtained a bite block was placed. Then the lubricated tip of the [id number] diagnostic video upper endoscope was carefully inserted and advanced using direct visualization into the posterior pharynx and ultimately into the esophagus, stomach, and duodenum. Air was then withdrawn and the endoscope was removed. The patient tolerated the procedure well.     Estimated blood loss: insignificant     Specimens collected: gastric polyp biopsies     Complications: none     EGD findings:       1. Esophagus: The top of the gastric folds was noted at 40 cm. There was a 2 cm hiatal hernia. The squamocolumnar junction was noted at 40 cm and appeared irregular. There were 3 ulcerations in the distal esophagus which was continuous at the squamocolumnar junction of 50-75% consistent with LA grade C/D  esophagitis. Evaluation for previously visualized and diagnosed Justin's was not possible in this setting and thus no biopsies taken due to need to improve esophagitis for evaluation of the underlying mucosa. The esophageal mucosa appeared unremarkable otherwise.  2. Stomach: The stomach distended normally. Normal rugal folds were seen. The pylorus was patent. There were multiple small, benign appearing, gastric polyps. Two cold forceps sample biopsies werew obtained. The gastric mucosa appeared unremarkable otherwise. Retroflexion revealed a normal fundus and cardia.   3. Duodenum: The duodenal mucosa appeared normal in the 1st and 2nd portion of the duodenum.      Impression:  1. LA grade C/D reflux esophagitis  2. Inability to evaluate known underlying Justin's esophagus due to #1  3. A small, 2 cm, sliding hiatal hernia  4. Multiple small benign appearing gastric polyps biopsied     Recommend:  1. Await pathology results  2. Change pantoprazole to omeprazole 40 mg BID  3. Repeat EGD - ideally in 2-3 months  4. Colonoscopy for colorectal cancer screening    PATH:    Gastric polyp; biopsy:  Fragment of gastric fundic gland polyp showing areas of minimal/mild chronic gastritis.   No evidence of intestinal metaplasia, epithelial dysplasia, or malignancy identified.   No evidence of bacterial organisms consistent with Helicobacter species seen on Giemsa stain (with appropriate control reactivity).    =======================  Esophagogastroduodenoscopy (EGD) & Colonoscopy Report           Oniel MOREL 1975 Age 47 year old   PCP JAY VIRCHOW Endoscopist Héctor Fischer MD      Date of procedure: 2022     Procedure: EGD w/ biopsy & Colonoscopy w/ biopsy     Pre-operative diagnosis: GERD, colorectal cancer screening     Post-operative diagnosis: irregular Z line, gastric polyps, colon polyp, hemorrhoids     Sedation: monitored anesthesia care (MAC)     Consent: We discussed the risks/benefits and  alternatives to this procedure, as well as the planned sedation. Informed consent was obtained from the patient after the risks of the procedure were discussed, including but not limited to bleeding, perforation, aspiration, infection, or possibility of a missed lesion as well as the risks of anesthesia including but not limited to cardiopulmonary complications. The patient signed informed consent and elected to proceed with EGD/Colonoscopy with intervention [i.e. Biopsy, control of bleeding, dilatation, polypectomy, endoscopic mucosal resection, etc.] as indicated.     EGD procedure: The patient was placed in the left lateral decubitus position and begun on continuous blood pressure pulse oximetry and EKG monitoring and this was maintained throughout the procedure. Once an adequate level of sedation was obtained a bite block was placed. Then the lubricated tip of the [id number] diagnostic video upper endoscope was carefully inserted and advanced using direct visualization into the posterior pharynx and ultimately into the esophagus. Air was then withdrawn and the endoscope was removed.     Colonoscopy procedure: Once an adequate level of sedation was obtained a digital rectal exam was completed, with normal tone and no masses palpated. Then the lubricated tip of the [id number] diagnostic video colonoscope was carefully inserted and advanced without difficulty to the cecum using the air insufflation technique (only Co2 was used for insufflation). The cecum was identified by localizing the trifold, the appendix and the ileocecal valve. Withdrawal was begun with thorough washing and careful examination of the colonic walls and folds. The ascending colon was viewed twice in the forward view. Photodocumentation was obtained. The bowel prep was good. Views of the colon were good with washing. Withdrawal time was 13 minutes.     Air was then withdrawn and the endoscope was removed. The patient tolerated the  procedure well. There were no immediate postoperative complications. The patient’s vital signs were monitored throughout the procedure and remained stable.     Estimated blood loss: insignificant     Specimens collected:  Esophageal biopsies, terminal ileal biopsies, colon polyp     Complications: none     EGD findings:       1. Esophagus: The squamocolumnar junction was noted at 39 cm and appeared very mildly irregular. Multiple cold forceps biopsies were obtained. There was no esophagitis. The esophageal mucosa appeared unremarkable otherwise.  2. Stomach: The stomach distended normally. Normal rugal folds were seen. The pylorus was patent. There were several small benign appearing proximal gastric polyps which were previously biopsied. The gastric mucosa appeared unremarkable otherwise. Retroflexion revealed a normal fundus and cardia.   3. Duodenum: The duodenal mucosa appeared normal in the 1st and 2nd portion of the duodenum.      Colonoscopy findings:  Terminal ileum: there was a somewhat more than usual degree of lymphoid hyperplasia appearance to the mucosa. Multiple cold forceps biopsies were obtained. Otherwise, normal mucosa     Cecum: normal mucosa and vascular pattern     Ascending colon: normal mucosa and vascular pattern     Transverse colon: normal mucosa and vascular pattern     Descending colon: normal mucosa and vascular pattern     Sigmoid colon: there was a 2-3 mm polyp removed by cold biopsy forceps. Otherwise, normal mucosa and vascular pattern     Rectum: retroflexed view showed small non-bleeding hemorrhoids. Otherwise, normal mucosa and vascular pattern.        Impression:  1. Healed LA grade C/D esophagitis on BID omeprazole  2. Mildly irregular Z line  3. Small benign appearing gastric polyps previously biopsied  4. A diminutive colon polyp removed  5. More than usual degree of appearance of lymphoid hyperplasia of the terminal ileum  6. Small non-bleeding hemorrhoids  7. Otherwise,  unremarkable colonoscopy and upper endoscopy     Recommend:  1. Await pathology.   2. Repeat colonoscopy interval pending final pathology results. If new signs or symptoms develop, procedure may need to be repeated sooner.   3. Continue your current medications  4. Follow up with your primary care physician on a routine basis    PATH:    A. Distal esophagus; biopsy:  Squamocolumnar mucosa with acute and chronic inflammation.  Negative for intestinal metaplasia.     B. Terminal ileum; biopsy:  Small intestinal mucosa with reactive follicular lymphoid hyperplasia.  Preserved villous architecture.     C. Sigmoid colon polyp:   Hyperplastic polyp.  =======================    Esophagogastroduodenoscopy (EGD) & Colonoscopy Report           Oniel MOREL 1975 Age 48 year old   PCP JAY VIRCHOW Endoscopist Héctor Fischer MD      Date of procedure: 2023     Procedure: EGD w/ biopsy & Colonoscopy     Pre-operative diagnosis: iron deficiency anemia     Post-operative diagnosis: see impression     Sedation: monitored anesthesia care (MAC)     Consent: We discussed the risks/benefits and alternatives to this procedure, as well as the planned sedation. Informed consent was obtained from the patient after the risks of the procedure were discussed, including but not limited to bleeding, perforation, aspiration, infection, or possibility of a missed lesion as well as the risks of anesthesia including but not limited to cardiopulmonary complications. The patient signed informed consent and elected to proceed with EGD/Colonoscopy with intervention [i.e. Biopsy, control of bleeding, dilatation, polypectomy, endoscopic mucosal resection, etc.] as indicated.     EGD procedure: The patient was placed in the left lateral decubitus position and begun on continuous blood pressure pulse oximetry and EKG monitoring and this was maintained throughout the procedure. Once an adequate level of sedation was obtained a bite block  was placed. Then the lubricated tip of the [id number] diagnostic video upper endoscope was carefully inserted and advanced using direct visualization into the posterior pharynx and ultimately into the esophagus. Air was then withdrawn and the endoscope was removed.     Colonoscopy procedure: Once an adequate level of sedation was obtained a digital rectal exam was completed, with normal tone and no masses palpated. Then the lubricated tip of the [id number] diagnostic video colonoscope was carefully inserted and advanced without difficulty to the cecum using the air insufflation technique (only Co2 was used for insufflation). The cecum was identified by localizing the trifold, the appendix and the ileocecal valve. Withdrawal was begun with thorough washing and careful examination of the colonic walls and folds. The ascending colon was viewed twice in the forward view. Photodocumentation was obtained. The bowel prep was good. Views of the colon were good with washing. Withdrawal time was 15 minutes.     Air was then withdrawn and the endoscope was removed. The patient tolerated the procedure well. There were no immediate postoperative complications. The patient’s vital signs were monitored throughout the procedure and remained stable.     Estimated blood loss: insignificant     Specimens collected:  esophageal, gastric, and duodenal biopsies     Complications: none     EGD findings:       1. Esophagus: The proximal and mid esophagus was unremarkable. There was linear erosions extending from the gastroesophageal junction with ulceration bridging circumferentially in the distal esophagus. Multiple cold forceps biopsies were obtained.   2. Stomach: The stomach distended normally. Normal rugal folds were seen. The pylorus was patent. There was a small polyp in the fundus. A cold forceps biopsy was obtained. The gastric mucosa appeared unremarkable otherwise. Multiple cold forceps biopsies were obtained  randomly. Retroflexion revealed a normal fundus and cardia.   3. Duodenum: The duodenal mucosa appeared normal in the 1st and 2nd portion of the duodenum. Multiple cold forceps biopsies were obtained randomly.     Colonoscopy findings:  Terminal ileum: normal mucosa     Cecum: normal mucosa and vascular pattern     Ascending colon: normal mucosa and vascular pattern     Transverse colon: normal mucosa and vascular pattern     Descending colon: normal mucosa and vascular pattern     Sigmoid colon: normal mucosa and vascular pattern     Rectum: retroflexed view showed small to medium sized non-bleeding hemorrhoids. Otherwise, normal mucosa and vascular pattern.     Impression:  1. LA grade D (severe) reflux esophagitis  2. A small gastric polyp biopsied  3. Hemorrhoids  4. Otherwise, unremarkable upper endoscopy and colonoscopy     Recommend:  1. Await pathology.   2. Omeprazole 40 mg BID  3. Further discussion of GERD management   4. Continue iron replacement therapy and monitoring blood counts  5. Follow up with your primary care physician and in the GI office      PATH:    CLINICAL INFORMATION  View Result View Result View Result View Result   Comment: Iron deficiency anemia   PATHOLOGIST        Comment: Deepa Lin M.D. PhD., Board Certified in Anatomic Pathology,  Specializing in Urologic Pathology and GI Pathology 4   (electronic signature)   A SOURCE        Comment: Duodenum, biopsy              A DIAGNOSIS        Comment: Small bowel mucosa with no diagnostic  histopathologic alterations including no evidence  of celiac disease.   B SOURCE        Comment: Stomach, biopsy              B DIAGNOSIS        Comment: Antral and fundic-type mucosa with mild reactive change.     No significant inflammation is present.     No Helicobacter pylori organisms are identified.      C SOURCE        Comment: Stomach, \"polyp\", biopsy              C DIAGNOSIS        Comment: Fundic gland polyp.   D SOURCE         Comment: Esophagus, biopsy              D DIAGNOSIS        Comment: Squamous mucosa with active inflammation and ulcer.     Cardiac-type columnar mucosa with colonic  information.     No evidence of goblet cell metaplasia.           ========================    \"GIVEN\" Small Bowel Capsule Endoscopy report     Pre-op Diagnosis: Iron deficiency anemia     Postoperative diagnosis: See impression     Study date: 5/29/2024     Sedation: none     Study details:     Video capsule enteroscopy examination is performed today for evaluation of severe iron deficiency anemia first diagnosed June 2023.  This has been treated with intravenous iron infusions elsewhere.     As per recent office note, 3 EGD examinations and 2 EGD/colonoscopy examinations completed 6/5/2019, 2/11/2020, 1/25/2022, 7/19/2022, 9/20/2023.     Repeat exams as above have shown reflux esophagitis with friable linear erosions to zak esophageal ulcers distal esophagus; small gastric glandular polyp; repeatedly normal colonoscopy examination with ileoscopy.  Most recent exam 9/20/2023 was an EGD/colonoscopy exam.     No H. pylori on gastric biopsies/polyps.     Normal biopsies of duodenum/terminal ileum.        The patient was given a bowel prep the night before this procedure with a diet of clear liquids and one bottle of magnesium citrate.  On the morning of the procedure, a dose of simethicone medication was administered and the patient was fitted with the recorder vest.  The Given capsule was activated and the patient swallowed it uneventfully.     Study data:     Capsule reached the stomach at 00:00:30  Capsule entered the duodenum at 00:57:56 and reached the cecum at 04:39:01  Small bowel passage time 3 hrs 41 mins     Study findings:  Overall good quality small bowel study with clear bilious chyme entire length of the examined small bowel, good visualization of small bowel mucosa.  1 photo of the GE junction shows ongoing reflux esophagitis and slow  active bleeding right at the Z-line.  No blood in the stomach.  Limited views of the stomach show clear secretions present, no blood no ulcer.  Rare red dots/villi seen throughout the small bowel but no small bowel ulcer, AVM lesion, fresh or old blood, or active bleeding.  Clear chyme throughout.  Exuberant circumferential lymphoid nodules/lymphoid hyperplasia terminal ileum just before capsule enters the cecum; light green stool in cecum     Recommendations:  [facility] with [facility] infusions  Continue aggressive therapy for reflux esophagitis with previously described esophageal ulcers, slow active bleeding on today's study.  Continue omeprazole 40 mg once daily indefinitely, step up to twice daily if anemia recurs         Current Outpatient Medications   Medication Sig Dispense Refill    albuterol 108 (90 Base) MCG/ACT Inhalation Aero Soln Inhale 2 puffs into the lungs every 4 (four) hours as needed for Wheezing or Shortness of Breath. 1 each 0    triamcinolone 0.1 % External Cream Apply topically 2 (two) times daily. 28.4 g 0    Omeprazole 40 MG Oral Capsule Delayed Release Take 1 capsule (40 mg total) by mouth 2 (two) times daily. 180 capsule 3    tadalafil 5 MG Oral Tab Take 1 tablet (5 mg total) by mouth daily.      Omeprazole 40 MG Oral Capsule Delayed Release Take 1 capsule (40 mg total) by mouth 2 (two) times a day. Take 1 capsule by mouth daily before breakfast. 180 capsule 3    FAMOTIDINE 40 MG Oral Tab TAKE 1 TABLET (40 MG TOTAL) BY MOUTH NIGHTLY AS NEEDED FOR HEARTBURN. 90 tablet 3    Levothyroxine Sodium 175 MCG Oral Tab Take 1 tablet (175 mcg total) by mouth before breakfast.      Ferrous Sulfate 324 (65 Fe) MG Oral Tab EC Take 1 tablet by mouth daily. (Patient not taking: Reported on 3/4/2025)      methylPREDNISolone (MEDROL) 4 MG Oral Tablet Therapy Pack Dosepack: take as directed (Patient not taking: Reported on 3/4/2025) 1 each 0    albuterol 108 (90 Base) MCG/ACT  Inhalation Aero Soln Inhale 2 puffs into the lungs every 4 (four) hours. (Patient not taking: Reported on 2/5/2025)      clobetasol 0.05 % External Cream Apply 0.05 % topically 2 (two) times daily. (Patient not taking: Reported on 3/4/2025)      Betaine HCl 300 MG Oral Tab Take 2,400 mg by mouth daily. (Patient not taking: Reported on 3/4/2025)      fluticasone propionate 50 MCG/ACT Nasal Suspension 1 spray by Nasal route 2 (two) times daily. (Patient not taking: Reported on 3/4/2025) 16 g 0    sucralfate 1 g Oral Tab Take 1 tablet (1 g total) by mouth 4 (four) times daily as needed. (Patient not taking: Reported on 3/4/2025) 120 tablet 3         Allergies:No Known Allergies  Imaging: No results found.       PHYSICAL EXAM:   Physical Exam                   Prior to this encounter, I spent over 10 minutes preparing for the visit, including reviewing documents from other specialties as well as from PCP and going over test results. During and after the face to face encounter, I spent an additional 65 minutes discussing the above and counseling this patient, reviewing chart notes and data with patient and composing this note.       Digital transcription software was utilized to produce this note. The note was proofread for content only. Typographical errors may remain.        Meds This Visit:  Requested Prescriptions      No prescriptions requested or ordered in this encounter       Imaging & Referrals:  None       ID#[id number]

## 2025-03-04 NOTE — TELEPHONE ENCOUNTER
Scheduled for:  Egd 92629  Provider Name: Dr. De La Cruz  Date:  9/25/25  Location:[facility]  Sedation:  MAC  Time:  0900 Am (Patient is aware that [facility] will call with arrival time )  Prep:  Npo after Midnight  Prep Instructions Given At The Office Visit  Meds/Allergies Reconciled?: Physician Reviewed   Diagnosis with codes:  Iron deficiency of anemia D50.0 , Gerd K21.9  Was patient informed to call insurance with codes (Y/N):  Yes  Referral sent?:  Referral was sent at the time of electronic surgical scheduling.  EM or [facility] notified?:  I sent an electronic request to Endo Scheduling and received a confirmation today.  Medication Orders:  Pt is aware to NOT take iron pills, herbal meds and diet supplements for 7 days before exam. Also to NOT take any form of alcohol, recreational drugs and any forms of ED meds 24 hours before exam.   Medication instructions:     Hold Cialis/Tadalafil or Viagra/sildenafil or medication > 3 days prior to procedure     Misc Orders:     Patient was informed about the new cancellation policy for his/her procedure. Patient was also given a copy of the cancellation policy at the time of the appointment and verbalized understanding.  Further instructions given by staff:  I provide prep instructions to patient at the time of the appointment/Mychart and reviewed date,  and location, patient verbalized that he understood and is aware to call if he has any questions.

## 2025-03-04 NOTE — PATIENT INSTRUCTIONS
Schedule E[facility] (stomach endoscopy) [facility]    BMI Readings from Last 1 Encounters:   03/04/25 29.90 kg/m²       MAC anesthesia    DX = GERD, reflux esophagitis, iron deficiency anemia    Medication instructions:    Hold Cialis/Tadalafil or Viagra/sildenafil or medication > 3 days prior to procedure

## 2025-04-03 ENCOUNTER — TELEPHONE (OUTPATIENT)
Facility: CLINIC | Age: 50
End: 2025-04-03

## 2025-04-03 NOTE — TELEPHONE ENCOUNTER
1st,overdue reminder letter mailed out to patient   Labs order   Orders Placed on 3/4/2025    CBC, Platelet; No Differential  Endomysial Antibody IGA  Ferritin  Immunoglobulin A, Qn, Serum (IGA)  Iron And Tibc  Tissue Transglutaminase Ab, IgA  
100

## 2025-04-15 LAB
% SATURATION: 6 % (CALC) (ref 20–48)
ENDOMYSIAL ANTIBODY SCR$(IGA) W/REFL TO TITER: NEGATIVE
FERRITIN: 4 NG/ML (ref 38–380)
HEMATOCRIT: 40.3 % (ref 38.5–50)
HEMOGLOBIN: 11.6 G/DL (ref 13.2–17.1)
IMMUNOGLOBULIN A: <5 MG/DL (ref 47–310)
IRON BINDING CAPACITY: 421 MCG/DL (CALC) (ref 250–425)
IRON, TOTAL: 27 MCG/DL (ref 50–180)
MCH: 21.2 PG (ref 27–33)
MCHC: 28.8 G/DL (ref 32–36)
MCV: 73.8 FL (ref 80–100)
MPV: 10.3 FL (ref 7.5–12.5)
PLATELET COUNT: 275 THOUSAND/UL (ref 140–400)
RDW: 15.6 % (ref 11–15)
RED BLOOD CELL COUNT: 5.46 MILLION/UL (ref 4.2–5.8)
TISSUE TRANSGLUTAMINASE$ANTIBODY, IGA: <1 U/ML
WHITE BLOOD CELL COUNT: 5.9 THOUSAND/UL (ref 3.8–10.8)

## 2025-05-04 ENCOUNTER — PATIENT MESSAGE (OUTPATIENT)
Dept: GASTROENTEROLOGY | Facility: CLINIC | Age: 50
End: 2025-05-04

## 2025-05-04 NOTE — TELEPHONE ENCOUNTER
Gerson Luis,    As per today's email, Oniel is still really low on iron.  So far the only obvious causes for this are chronic bleeding from his severe reflux esophagitis and/or hemorrhoids.    He has [facility] insurance.  Do think we could get him set up for 2 Feraheme infusions with IVX infusion?      FERAHEME dosing and administration    DILUTED IV INFUSION    Dosing schedule     Initial 510 mg dose followed by a second 510 mg dose as early as 3 days and up to 8 days* later      - cb

## 2025-05-07 ENCOUNTER — TELEPHONE (OUTPATIENT)
Facility: CLINIC | Age: 50
End: 2025-05-07

## 2025-05-07 NOTE — TELEPHONE ENCOUNTER
Gerson Luis,     As per today's email, Oniel is still really low on iron.  So far the only obvious causes for this are chronic bleeding from his severe reflux esophagitis and/or hemorrhoids.     He has [facility] insurance.  Do think we could get him set up for 2 Feraheme infusions with IVX infusion?        FERAHEME dosing and administration     DILUTED IV INFUSION     Dosing schedule                  Initial 510 mg dose followed by a second 510 mg dose as early as 3 days and up to 8 days* later        - cb

## 2025-05-07 NOTE — TELEPHONE ENCOUNTER
Order for Feraheme with chart notes, copy of recent labs, face sheet and copy of insurance cards along with the order form faxed to [employer] at [phone]

## 2025-05-07 NOTE — TELEPHONE ENCOUNTER
Order for Feraheme with chart notes, copy of recent labs, face sheet and copy of insurance cards along with the order form faxed to [employer] at [phone]

## 2025-05-21 NOTE — TELEPHONE ENCOUNTER
I called [facility]    They never received fax for the Feraheme    Order for Feraheme with chart notes, copy of recent labs, face sheet and copy of insurance cards along with the order form faxed to [facility] at [phone]     Fax confirmation received

## 2025-05-21 NOTE — TELEPHONE ENCOUNTER
I spoke to the pt     He has not heard from [facility] [facility]    I will call [employer] tomorrow to follow up

## 2025-05-23 NOTE — TELEPHONE ENCOUNTER
Dr. Dusty CUBA called [facility] and spoke to representative Lidya Bose explained that the patient's insurance will not cover feraheme and is recommending patient try venofer instead (this is the preferred medication). If you recommend patient try venofer instead, a new order will need to be submitted to [facility]    Please advise    Thank you

## 2025-05-23 NOTE — TELEPHONE ENCOUNTER
Alee from [facility] states feraheme is non preferred but Venofer is covered please follow up they will need a new order for venofer

## 2025-05-27 NOTE — TELEPHONE ENCOUNTER
Message noted.    Insurance prefers to pay for numerous VENOFER infusions vs FERAHEME.  Bewildering as I thought that the INFUSION costs were the bigger expense.    VENOFER orders for [facility]:    Administer Venofer in 3 divided doses, given by slow intravenous infusion, within a 28 day period: 2 infusions each of 300 mg over 1.5 hours 14 days apart followed by one 400 mg infusion over 2.5 hours 14 days later    - cb

## 2025-05-27 NOTE — TELEPHONE ENCOUNTER
I called [facility], left message on their voicemail for someone to called me back    Order for Venofer was faxed to [facility] earlier today

## 2025-05-27 NOTE — TELEPHONE ENCOUNTER
Alee from [facility] states they got iron infusion request and is requesting for venopher it is covered by insurance requesting new order please fax [phone]

## 2025-06-04 ENCOUNTER — TELEPHONE (OUTPATIENT)
Facility: CLINIC | Age: 50
End: 2025-06-04

## 2025-06-04 NOTE — TELEPHONE ENCOUNTER
I received Nurses Notes from Tame    Pt received his first Venofer infusion on 06/03/2025    Next scheduled appt is for 06/16/2025  Pt will need a third dose before pt outreach message is sent to repeat CBC & Iron Studies

## 2025-06-11 ENCOUNTER — TELEPHONE (OUTPATIENT)
Facility: CLINIC | Age: 50
End: 2025-06-11

## 2025-06-12 NOTE — TELEPHONE ENCOUNTER
I received nurses notes from M87    Pt received Venofer on 06/04/2025    Next and final appointment is 06/17/2025    Nurses notes sent to scan

## 2025-07-07 ENCOUNTER — TELEPHONE (OUTPATIENT)
Facility: CLINIC | Age: 50
End: 2025-07-07

## 2025-07-07 DIAGNOSIS — D50.0 IRON DEFICIENCY ANEMIA DUE TO CHRONIC BLOOD LOSS: Primary | ICD-10-CM

## 2025-07-07 DIAGNOSIS — K21.9 GASTROESOPHAGEAL REFLUX DISEASE WITHOUT ESOPHAGITIS: ICD-10-CM

## 2025-07-07 NOTE — TELEPHONE ENCOUNTER
[employer] is calling to inform MD that patient completed the 3 iron infusions that were order.

## 2025-07-07 NOTE — TELEPHONE ENCOUNTER
Dr De La Cruz- SHABBIR called patient has completed 3 Venofer infusions, would you like to repeat cbc and iron studies? Orders pended.

## 2025-07-09 NOTE — TELEPHONE ENCOUNTER
Thank you so much Toby.    Yes, strange story of severe iron deficiency.    Administered total of 1000 mg Venofer June 2025.    Follow-up CBC and iron orders signed.    - cb

## 2025-07-10 NOTE — TELEPHONE ENCOUNTER
I spoke to the pt    He states he will go to the [facility] the first week of August for his CBC & Iron Studies

## 2025-08-06 RX ORDER — FAMOTIDINE 40 MG/1
40 TABLET, FILM COATED ORAL NIGHTLY PRN
Qty: 90 TABLET | Refills: 3 | Status: SHIPPED | OUTPATIENT
Start: 2025-08-06

## 2025-08-11 ENCOUNTER — TELEPHONE (OUTPATIENT)
Facility: CLINIC | Age: 50
End: 2025-08-11

## 2025-08-12 ENCOUNTER — OFFICE VISIT (OUTPATIENT)
Dept: SURGERY | Facility: CLINIC | Age: 50
End: 2025-08-12

## 2025-08-12 VITALS — HEART RATE: 67 BPM | SYSTOLIC BLOOD PRESSURE: 105 MMHG | DIASTOLIC BLOOD PRESSURE: 66 MMHG

## 2025-08-12 DIAGNOSIS — K64.0 GRADE I HEMORRHOIDS: Primary | ICD-10-CM

## 2025-08-12 LAB
% SATURATION: 15 % (CALC) (ref 20–48)
ABSOLUTE BASOPHILS: 39 CELLS/UL (ref 0–200)
ABSOLUTE EOSINOPHILS: 99 CELLS/UL (ref 15–500)
ABSOLUTE LYMPHOCYTES: 1293 CELLS/UL (ref 850–3900)
ABSOLUTE MONOCYTES: 407 CELLS/UL (ref 200–950)
ABSOLUTE NEUTROPHILS: 3663 CELLS/UL (ref 1500–7800)
BASOPHILS: 0.7 %
EOSINOPHILS: 1.8 %
FERRITIN: 45 NG/ML (ref 38–380)
HEMATOCRIT: 43.5 % (ref 38.5–50)
HEMOGLOBIN: 13.7 G/DL (ref 13.2–17.1)
IRON BINDING CAPACITY: 331 MCG/DL (CALC) (ref 250–425)
IRON, TOTAL: 49 MCG/DL (ref 50–180)
LYMPHOCYTES: 23.5 %
MCH: 25.8 PG (ref 27–33)
MCHC: 31.5 G/DL (ref 32–36)
MCV: 81.8 FL (ref 80–100)
MONOCYTES: 7.4 %
MPV: 10.7 FL (ref 7.5–12.5)
NEUTROPHILS: 66.6 %
PLATELET COUNT: 226 THOUSAND/UL (ref 140–400)
RDW: 18.9 % (ref 11–15)
RED BLOOD CELL COUNT: 5.32 MILLION/UL (ref 4.2–5.8)
WHITE BLOOD CELL COUNT: 5.5 THOUSAND/UL (ref 3.8–10.8)

## 2025-08-12 PROCEDURE — 46221 LIGATION OF HEMORRHOID(S): CPT | Performed by: SURGERY

## 2025-08-13 RX ORDER — FAMOTIDINE 40 MG/1
40 TABLET, FILM COATED ORAL NIGHTLY PRN
Qty: 90 TABLET | Refills: 3 | OUTPATIENT
Start: 2025-08-13

## (undated) DIAGNOSIS — Z12.12 SCREENING FOR CANCER OF THE RECTUM: ICD-10-CM

## (undated) DIAGNOSIS — K21.00 GASTROESOPHAGEAL REFLUX DISEASE WITH ESOPHAGITIS, UNSPECIFIED WHETHER HEMORRHAGE: ICD-10-CM

## (undated) DIAGNOSIS — Z12.11 SCREEN FOR COLON CANCER: Primary | ICD-10-CM

## (undated) DIAGNOSIS — K22.70 BARRETT'S ESOPHAGUS WITHOUT DYSPLASIA: ICD-10-CM

## (undated) DEVICE — CAPSULE ENDOSCP 11.4X26.2MM BIOCOMPATIBLE

## (undated) DEVICE — LINE MNTR ADLT SET O2 INTMD

## (undated) DEVICE — FORCEP RADIAL JAW 4

## (undated) DEVICE — KIT CLEAN ENDOKIT 1.1OZ GOWNX2

## (undated) DEVICE — KIT ENDO ORCAPOD 160/180/190

## (undated) DEVICE — Device: Brand: CUSTOM PROCEDURE KIT

## (undated) DEVICE — CONMED SCOPE SAVER BITE BLOCK, 20X27 MM: Brand: SCOPE SAVER

## (undated) DEVICE — 35 ML SYRINGE REGULAR TIP: Brand: MONOJECT

## (undated) DEVICE — Device: Brand: DEFENDO AIR/WATER/SUCTION AND BIOPSY VALVE

## (undated) NOTE — LETTER
4/3/2025          Oniel Christine    [id number] [facility] [address]         Dear Oniel,    Our records indicate that the tests ordered for you by Shawn De La Cruz MD  have not been done.  If you have, in fact, already completed the tests or you do not wish to have the tests done, please contact our office at THE NUMBER LISTED BELOW.  Otherwise, please proceed with the testing.  Enclosed is a duplicate order for your convenience.  Labs order   Orders Placed on 3/4/2025  CBC, Platelet; No Differential  Endomysial Antibody IGA  Ferritin  Immunoglobulin A, Qn, Serum (IGA)  Iron And Tibc  Tissue Transglutaminase Ab, IgA    To schedule a test at any [facility] [place], call Central Scheduling at [phone], Monday through Friday between 7:30am to 6pm and on Saturday between 8am and 1pm.   Evening and weekend appointments for your exam are available.   Please call [facility] at [phone] to schedule this test order.    Sincerely,    Shawn De La Cruz MD  [address] [address] [phone]

## (undated) NOTE — LETTER
Patient Name: Oniel Christine        : 1975       Medical Record #: [id number]    CONSENT FOR PROCEDURES/SEDATION    Date: 12/10/2024       Time: 11:20 AM        1. I authorize the performance upon Oniel Christine the following:    __________________________________________________________________________    2. I authorize Dr. Wood (and whomever is designated as the doctor’s assistant), to perform the above mentioned procedures.    3. If any unforeseen conditions arise during this procedure calling for additional procedures, operations, or medications (including anesthesia and blood transfusion), I  further request and authorize the doctor to do whatever he/she deems advisable in my interest.    4. I consent to the taking and reproduction of any photographs in the course of this procedure for professional purposes.    5. I consent to the administration of such sedation as may be considered necessary or advisable by the physician responsible for this service, with the exception of  _____________________________.    6. I have been informed by my doctor of the nature and purpose of this procedure/sedation, possible alternative methods of treatment, risk involved and possible complications.      Signature of Patient:  ___________________________    Signature of person authorized to consent for patient: Relationship to patient:  ___________________________    ___________________    Witness: ____________________     Date: ______________    Provider: ____________________     Date: ______________

## (undated) NOTE — LETTER
[address], [place]  Authorization for Invasive Procedures  1.  I hereby authorize Dr. Gayla Perez , my physician and whomever may be designated as the doctor's assistant, to perform the following operation and/or procedure:  Star Yusuf performed for the purposes of advancing medicine, science, and/or education, provided my identity is not revealed. If the procedure has been videotaped, the physician/surgeon will obtain the original videotape.  The hospital will not be responsible for stor My signature below affirms that prior to the time of the procedure, I have explained to the patient and/or his legal representative, the risks and benefits involved in the proposed treatment and any reasonable alternative to the proposed treatment.  I have

## (undated) NOTE — LETTER
Patient Name: Oniel Christine        : 1975       Medical Record #: [id number]    CONSENT FOR PROCEDURES/SEDATION    Date: 2024       Time: 11:34 AM        1. I authorize the performance upon Oniel Christine the following:    _________________Hemorrhoidal Banding________________________________________________________    2. I authorize Dr. LONNIE Wood (and whomever is designated as the doctor’s assistant), to perform the above mentioned procedures.    3. If any unforeseen conditions arise during this procedure calling for additional procedures, operations, or medications (including anesthesia and blood transfusion), I  further request and authorize the doctor to do whatever he/she deems advisable in my interest.    4. I consent to the taking and reproduction of any photographs in the course of this procedure for professional purposes.    5. I consent to the administration of such sedation as may be considered necessary or advisable by the physician responsible for this service, with the exception of  ____________None_________________.    6. I have been informed by my doctor of the nature and purpose of this procedure/sedation, possible alternative methods of treatment, risk involved and possible complications.      Signature of Patient:  ___________________________    Signature of person authorized to consent for patient: Relationship to patient:  ___________________________    ___________________    Witness: ____________________     Date: ______________    Provider: ____________________     Date: ______________

## (undated) NOTE — LETTER
4/15/2021    Jeff Gallardo        [address] [facility] [address]            Dear Jeff Gallardo,      Our records indicate that you are due for an appointment for a EGD or Upper Endoscopy in June 2021, or shortly there after, with SANJAY Angulo

## (undated) NOTE — LETTER
[facility]  Administration of Anesthesia  1. Dawson Mendoza, or _________________________________ acting on his behalf, (Patient) (Dependent/Representative) request to receive anesthesia for my pending procedure/operation/treatment.   A physi infections, high spinal block, spinal bleeding, seizure, cardiac arrest and death. 7. AWARENESS: I understand that it is possible (but unlikely) to have explicit memory of events from the operating room while under general anesthesia.   8. ELECTROCONVULSIV unconscious pt /Relationship    My signature below affirms that prior to the time of the procedure, I have explained to the patient and/or his/her guardian, the risks and benefits of undergoing anesthesia, as well as any reasonable alternatives.     _______

## (undated) NOTE — LETTER
[facility]  [address], [place], [place]    Authorization for Surgical Operation and Procedure                               I hereby authorize Shawn De La Cruz MD, my physician and his/her assistants (if applicable), which may include medical students, residents, and/or fellows, to perform the following surgical operation/ procedure and administer such anesthesia as may be determined necessary by my physician: Operation/Procedure name (s) CAPSULE ENDOSCOPY on Oniel Christine   2.   I recognize that during the surgical operation/procedure, unforeseen conditions may necessitate additional or different procedures than those listed above.  I, therefore, further authorize and request that the above-named surgeon, assistants, or designees perform such procedures as are, in their judgment, necessary and desirable.    3.   My surgeon/physician has discussed prior to my surgery the potential benefits, risks and side effects of this procedure; the likelihood of achieving goals; and potential problems that might occur during recuperation.  They also discussed reasonable alternatives to the procedure, including risks, benefits, and side effects related to the alternatives and risks related to not receiving this procedure.  I have had all my questions answered and I acknowledge that no guarantee has been made as to the result that may be obtained.    4.   Should the need arise during my operation/procedure, which includes change of level of care prior to discharge, I also consent to the administration of blood and/or blood products.  Further, I understand that despite careful testing and screening of blood or blood products by collecting agencies, I may still be subject to ill effects as a result of receiving a blood transfusion and/or blood products.  The following are some, but not all, of the potential risks that can occur: fever and allergic reactions, hemolytic reactions, transmission of diseases  such as Hepatitis, AIDS and Cytomegalovirus (CMV) and fluid overload.  In the event that I wish to have an autologous transfusion of my own blood, or a directed donor transfusion, I will discuss this with my physician.  Check only if Refusing Blood or Blood Products  I understand refusal of blood or blood products as deemed necessary by my physician may have serious consequences to my condition to include possible death. I hereby assume responsibility for my refusal and release the hospital, its personnel, and my physicians from any responsibility for the consequences of my refusal.    o  Refuse   5.   I authorize the use of any specimen, organs, tissues, body parts or foreign objects that may be removed from my body during the operation/procedure for diagnosis, research or teaching purposes and their subsequent disposal by hospital authorities.  I also authorize the release of specimen test results and/or written reports to my treating physician on the hospital medical staff or other referring or consulting physicians involved in my care, at the discretion of the Pathologist or my treating physician.    6.   I consent to the photographing or videotaping of the operations or procedures to be performed, including appropriate portions of my body for medical, scientific, or educational purposes, provided my identity is not revealed by the pictures or by descriptive texts accompanying them.  If the procedure has been photographed/videotaped, the surgeon will obtain the original picture, image, videotape or CD.  The hospital will not be responsible for storage, release or maintenance of the picture, image, tape or CD.    7.   I consent to the presence of a  or observers in the operating room as deemed necessary by my physician or their designees.    8.   I recognize that in the event my procedure results in extended X-Ray/fluoroscopy time, I may develop a skin reaction.    9. If I have a Do Not Attempt  Resuscitation (DNAR) order in place, that status will be suspended while in the operating room, procedural suite, and during the recovery period unless otherwise explicitly stated by me (or a person authorized to consent on my behalf). The surgeon or my attending physician will determine when the applicable recovery period ends for purposes of reinstating the DNAR order.  10. Patients having a sterilization procedure: I understand that if the procedure is successful the results will be permanent and it will therefore be impossible for me to inseminate, conceive, or bear children.  I also understand that the procedure is intended to result in sterility, although the result has not been guaranteed.   11. I acknowledge that my physician has explained sedation/analgesia administration to me including the risk and benefits I consent to the administration of sedation/analgesia as may be necessary or desirable in the judgment of my physician.    I CERTIFY THAT I HAVE READ AND FULLY UNDERSTAND THE ABOVE CONSENT TO OPERATION and/or OTHER PROCEDURE.     ____________________________________  _________________________________        ______________________________  Signature of Patient    Signature of Responsible Person                Printed Name of Responsible Person                                      ____________________________________  _____________________________                ________________________________  Signature of Witness        Date  Time         Relationship to Patient    STATEMENT OF PHYSICIAN My signature below affirms that prior to the time of the procedure; I have explained to the patient and/or his/her legal representative, the risks and benefits involved in the proposed treatment and any reasonable alternative to the proposed treatment. I have also explained the risks and benefits involved in refusal of the proposed treatment and alternatives to the proposed treatment and have answered the patient's  questions. If I have a significant financial interest in a co-management agreement or a significant financial interest in any product or implant, or other significant relationship used in this procedure/surgery, I have disclosed this and had a discussion with my patient.     _____________________________________________________              _____________________________  (Signature of Physician)                                                                                         (Date)                                   (Time)  Patient Name: Oniel Christine      : 1975      Printed: 2024     Medical Record #: [id number]                                      Page 1 of 1